# Patient Record
Sex: FEMALE | Race: WHITE | NOT HISPANIC OR LATINO | Employment: FULL TIME | URBAN - METROPOLITAN AREA
[De-identification: names, ages, dates, MRNs, and addresses within clinical notes are randomized per-mention and may not be internally consistent; named-entity substitution may affect disease eponyms.]

---

## 2023-11-29 ENCOUNTER — OFFICE VISIT (OUTPATIENT)
Dept: PRIMARY CARE | Facility: CLINIC | Age: 53
End: 2023-11-29
Payer: COMMERCIAL

## 2023-11-29 ENCOUNTER — LAB (OUTPATIENT)
Dept: LAB | Facility: LAB | Age: 53
End: 2023-11-29
Payer: COMMERCIAL

## 2023-11-29 VITALS
SYSTOLIC BLOOD PRESSURE: 130 MMHG | WEIGHT: 209 LBS | HEART RATE: 76 BPM | OXYGEN SATURATION: 97 % | DIASTOLIC BLOOD PRESSURE: 88 MMHG | BODY MASS INDEX: 33.73 KG/M2

## 2023-11-29 DIAGNOSIS — F31.81 BIPOLAR 2 DISORDER (MULTI): ICD-10-CM

## 2023-11-29 DIAGNOSIS — R41.3 MEMORY DEFICIT: ICD-10-CM

## 2023-11-29 DIAGNOSIS — R41.3 MEMORY DEFICIT: Primary | ICD-10-CM

## 2023-11-29 DIAGNOSIS — I10 HYPERTENSION, UNSPECIFIED TYPE: ICD-10-CM

## 2023-11-29 PROBLEM — F32.A DEPRESSION: Status: ACTIVE | Noted: 2023-11-29

## 2023-11-29 PROBLEM — R06.02 SHORTNESS OF BREATH: Status: ACTIVE | Noted: 2023-11-29

## 2023-11-29 PROBLEM — L81.9 PIGMENTED SKIN LESION SUSPICIOUS FOR MALIGNANT NEOPLASM: Status: ACTIVE | Noted: 2023-11-29

## 2023-11-29 PROBLEM — D22.5 MELANOCYTIC NEVI OF TRUNK: Status: ACTIVE | Noted: 2022-01-27

## 2023-11-29 PROBLEM — H69.90 EUSTACHIAN TUBE DYSFUNCTION: Status: ACTIVE | Noted: 2023-11-29

## 2023-11-29 PROBLEM — L57.0 ACTINIC KERATOSIS: Status: ACTIVE | Noted: 2022-01-27

## 2023-11-29 PROBLEM — U07.1 COVID-19: Status: ACTIVE | Noted: 2023-11-29

## 2023-11-29 PROBLEM — L82.1 OTHER SEBORRHEIC KERATOSIS: Status: ACTIVE | Noted: 2022-01-27

## 2023-11-29 PROBLEM — M75.02 ADHESIVE CAPSULITIS OF LEFT SHOULDER: Status: ACTIVE | Noted: 2023-11-29

## 2023-11-29 PROBLEM — D48.5 NEOPLASM OF UNCERTAIN BEHAVIOR OF SKIN: Status: ACTIVE | Noted: 2022-01-27

## 2023-11-29 PROBLEM — D18.01 HEMANGIOMA OF SKIN AND SUBCUTANEOUS TISSUE: Status: ACTIVE | Noted: 2022-01-27

## 2023-11-29 PROBLEM — N63.10 BREAST MASS, RIGHT: Status: ACTIVE | Noted: 2023-11-29

## 2023-11-29 LAB
25(OH)D3 SERPL-MCNC: 21 NG/ML (ref 30–100)
ALBUMIN SERPL BCP-MCNC: 4.4 G/DL (ref 3.4–5)
ALP SERPL-CCNC: 72 U/L (ref 33–110)
ALT SERPL W P-5'-P-CCNC: 16 U/L (ref 7–45)
AMPHETAMINES UR QL SCN: NORMAL
ANION GAP SERPL CALC-SCNC: 10 MMOL/L (ref 10–20)
APPEARANCE UR: CLEAR
AST SERPL W P-5'-P-CCNC: 14 U/L (ref 9–39)
BARBITURATES UR QL SCN: NORMAL
BASOPHILS # BLD AUTO: 0.03 X10*3/UL (ref 0–0.1)
BASOPHILS NFR BLD AUTO: 0.5 %
BENZODIAZ UR QL SCN: NORMAL
BILIRUB SERPL-MCNC: 0.4 MG/DL (ref 0–1.2)
BILIRUB UR STRIP.AUTO-MCNC: NEGATIVE MG/DL
BUN SERPL-MCNC: 8 MG/DL (ref 6–23)
BZE UR QL SCN: NORMAL
CALCIUM SERPL-MCNC: 8.9 MG/DL (ref 8.6–10.3)
CANNABINOIDS UR QL SCN: NORMAL
CHLORIDE SERPL-SCNC: 103 MMOL/L (ref 98–107)
CO2 SERPL-SCNC: 28 MMOL/L (ref 21–32)
COLOR UR: YELLOW
CREAT SERPL-MCNC: 0.76 MG/DL (ref 0.5–1.05)
EOSINOPHIL # BLD AUTO: 0.09 X10*3/UL (ref 0–0.7)
EOSINOPHIL NFR BLD AUTO: 1.6 %
ERYTHROCYTE [DISTWIDTH] IN BLOOD BY AUTOMATED COUNT: 12.4 % (ref 11.5–14.5)
FENTANYL+NORFENTANYL UR QL SCN: NORMAL
GFR SERPL CREATININE-BSD FRML MDRD: >90 ML/MIN/1.73M*2
GLUCOSE SERPL-MCNC: 88 MG/DL (ref 74–99)
GLUCOSE UR STRIP.AUTO-MCNC: NEGATIVE MG/DL
HCT VFR BLD AUTO: 43.4 % (ref 36–46)
HGB BLD-MCNC: 14 G/DL (ref 12–16)
HIV 1+2 AB+HIV1 P24 AG SERPL QL IA: NONREACTIVE
IMM GRANULOCYTES # BLD AUTO: 0.02 X10*3/UL (ref 0–0.7)
IMM GRANULOCYTES NFR BLD AUTO: 0.4 % (ref 0–0.9)
KETONES UR STRIP.AUTO-MCNC: NEGATIVE MG/DL
LEUKOCYTE ESTERASE UR QL STRIP.AUTO: NEGATIVE
LYMPHOCYTES # BLD AUTO: 2.19 X10*3/UL (ref 1.2–4.8)
LYMPHOCYTES NFR BLD AUTO: 38.4 %
MCH RBC QN AUTO: 30.8 PG (ref 26–34)
MCHC RBC AUTO-ENTMCNC: 32.3 G/DL (ref 32–36)
MCV RBC AUTO: 95 FL (ref 80–100)
MONOCYTES # BLD AUTO: 0.46 X10*3/UL (ref 0.1–1)
MONOCYTES NFR BLD AUTO: 8.1 %
NEUTROPHILS # BLD AUTO: 2.92 X10*3/UL (ref 1.2–7.7)
NEUTROPHILS NFR BLD AUTO: 51 %
NITRITE UR QL STRIP.AUTO: NEGATIVE
NRBC BLD-RTO: 0 /100 WBCS (ref 0–0)
OPIATES UR QL SCN: NORMAL
OXYCODONE+OXYMORPHONE UR QL SCN: NORMAL
PCP UR QL SCN: NORMAL
PH UR STRIP.AUTO: 7 [PH]
PLATELET # BLD AUTO: 256 X10*3/UL (ref 150–450)
POTASSIUM SERPL-SCNC: 3.8 MMOL/L (ref 3.5–5.3)
PROT SERPL-MCNC: 6.7 G/DL (ref 6.4–8.2)
PROT UR STRIP.AUTO-MCNC: NEGATIVE MG/DL
RBC # BLD AUTO: 4.55 X10*6/UL (ref 4–5.2)
RBC # UR STRIP.AUTO: NEGATIVE /UL
SODIUM SERPL-SCNC: 137 MMOL/L (ref 136–145)
SP GR UR STRIP.AUTO: 1
T PALLIDUM AB SER QL: NONREACTIVE
TSH SERPL-ACNC: 2.21 MIU/L (ref 0.44–3.98)
UROBILINOGEN UR STRIP.AUTO-MCNC: <2 MG/DL
VIT B12 SERPL-MCNC: 132 PG/ML (ref 211–911)
WBC # BLD AUTO: 5.7 X10*3/UL (ref 4.4–11.3)

## 2023-11-29 PROCEDURE — 85025 COMPLETE CBC W/AUTO DIFF WBC: CPT

## 2023-11-29 PROCEDURE — 86780 TREPONEMA PALLIDUM: CPT

## 2023-11-29 PROCEDURE — 84443 ASSAY THYROID STIM HORMONE: CPT

## 2023-11-29 PROCEDURE — 80307 DRUG TEST PRSMV CHEM ANLYZR: CPT

## 2023-11-29 PROCEDURE — 1036F TOBACCO NON-USER: CPT | Performed by: FAMILY MEDICINE

## 2023-11-29 PROCEDURE — 36415 COLL VENOUS BLD VENIPUNCTURE: CPT

## 2023-11-29 PROCEDURE — 3075F SYST BP GE 130 - 139MM HG: CPT | Performed by: FAMILY MEDICINE

## 2023-11-29 PROCEDURE — 3079F DIAST BP 80-89 MM HG: CPT | Performed by: FAMILY MEDICINE

## 2023-11-29 PROCEDURE — 82607 VITAMIN B-12: CPT

## 2023-11-29 PROCEDURE — 80053 COMPREHEN METABOLIC PANEL: CPT

## 2023-11-29 PROCEDURE — 82306 VITAMIN D 25 HYDROXY: CPT

## 2023-11-29 PROCEDURE — 81003 URINALYSIS AUTO W/O SCOPE: CPT

## 2023-11-29 PROCEDURE — 99214 OFFICE O/P EST MOD 30 MIN: CPT | Performed by: FAMILY MEDICINE

## 2023-11-29 PROCEDURE — 87389 HIV-1 AG W/HIV-1&-2 AB AG IA: CPT

## 2023-11-29 RX ORDER — ESCITALOPRAM OXALATE 20 MG/1
20 TABLET ORAL DAILY
Qty: 90 TABLET | Refills: 0 | Status: SHIPPED | OUTPATIENT
Start: 2023-11-29 | End: 2024-02-28 | Stop reason: SDUPTHER

## 2023-11-29 RX ORDER — METOPROLOL SUCCINATE 25 MG/1
25 TABLET, EXTENDED RELEASE ORAL DAILY
Qty: 90 TABLET | Refills: 0 | Status: SHIPPED | OUTPATIENT
Start: 2023-11-29 | End: 2024-02-28 | Stop reason: SDUPTHER

## 2023-11-29 RX ORDER — ARIPIPRAZOLE 10 MG/1
10 TABLET ORAL DAILY
Qty: 90 TABLET | Refills: 0 | Status: SHIPPED | OUTPATIENT
Start: 2023-11-29 | End: 2024-02-28 | Stop reason: SDUPTHER

## 2023-11-29 RX ORDER — METOPROLOL SUCCINATE 25 MG/1
25 TABLET, EXTENDED RELEASE ORAL DAILY
COMMUNITY
Start: 2023-07-17 | End: 2023-11-29 | Stop reason: SDUPTHER

## 2023-11-29 RX ORDER — ARIPIPRAZOLE 10 MG/1
1 TABLET ORAL DAILY
COMMUNITY
Start: 2020-09-02 | End: 2023-11-29 | Stop reason: SDUPTHER

## 2023-11-29 RX ORDER — ESCITALOPRAM OXALATE 20 MG/1
1 TABLET ORAL DAILY
COMMUNITY
Start: 2020-09-02 | End: 2023-11-29 | Stop reason: SDUPTHER

## 2023-11-29 ASSESSMENT — MINI MENTAL STATE EXAM
HAND THE PERSON A PENCIL AND PAPER. SAY:  WRITE ANY COMPLETE SENTENCE ON THAT PIECE OF PAPER. (NOTE: THE SENTENCE MUST MAKE SENSE.  IGNORE SPELLING ERRORS): 1 CORRECT
WHAT STATE, COUNTRY, CITY, HOSPITAL, FLOOR: 5 CORRECT
SHOW: PENCIL [OBJECT] ASK: WHAT IS THIS CALLED?: 2 CORRECT
NAME OR REPEAT 3 OBJECTS - (APPLE, TABLE, PENNY) OR (BALL, TREE, FLAG): 3 CORRECT
SUM ALL MMSE QUESTIONS FOR TOTAL SCORE [OUT OF 30].: 28
PLEASE COPY THIS PICTURE (NOTE ALL 10 ANGLES MUST BE PRESENT AND TWO MUST INTERSECT): 1 CORRECT
SAY:  READ THE WORDS ON THE PAGE AND THEN DO WHAT IT SAYS.  THEN HAND THE PERSON THE SHEET WITH CLOSE YOUR EYES ON IT.  IF THE SUBJECT READS AND DOES NOT CLOSE THEIR EYES, REPEAT UP TO THREE TIMES.  SCORE ONLY IF SUBJECT CLOSES EYES.: 3 CORRECT
RECALL THE 3 OBJECTS FROM ABOVE (APPLE, TABLE, PENNY) OR (BALL, TREE, FLAG): 2 CORRECT
WHAT IS THE YEAR, SEASON, DATE, DAY, AND MONTH: 4 CORRECT
SAY: I WOULD LIKE YOU TO REPEAT THIS PHRASE AFTER ME: NO IFS, ANDS, OR BUTS.: 1 CORRECT
SPELL THE WORD WORLD FORWARD AND BACKWARDS OR SERIAL 7S: 5 CORRECT
PLACE DESIGN, ERASER AND PENCIL IN FRONT OF THE PERSON.  SAY:  COPY THIS DESIGN PLEASE.  SHOW: DESIGN. ALLOW: MULTIPLE TRIES. WAIT UNTIL PERSON IS FINISHED AND HANDS IT BACK. SCORE: ONLY FOR DIAGRAM WITH 4-SIDED FIGURE BETWEEN TWO 5-SIDED FIGURES: 1 CORRECT

## 2023-11-29 NOTE — PROGRESS NOTES
Subjective   Patient ID: Debora Latif is a 53 y.o. female who presents for Med Refill (Memory issues).    Med Refill       She is having a lot of trouble with her memory. She is forgetting people she has met and things she has done. People are covering for her. She is getting lost when going places. Forgets peoples names and has forgotten to pay bills.  Doing well with her depression. Needs refills on meds  Review of Systems   All other systems reviewed and are negative.      Objective   /88   Pulse 76   Wt 94.8 kg (209 lb)   SpO2 97%   BMI 33.73 kg/m²     Physical Exam  Constitutional:       Appearance: Normal appearance.   HENT:      Head: Normocephalic.      Right Ear: Tympanic membrane normal.      Nose: Nose normal.      Mouth/Throat:      Mouth: Mucous membranes are moist.   Eyes:      Pupils: Pupils are equal, round, and reactive to light.   Cardiovascular:      Rate and Rhythm: Normal rate and regular rhythm.      Pulses: Normal pulses.   Pulmonary:      Effort: Pulmonary effort is normal.   Abdominal:      General: Abdomen is flat.   Musculoskeletal:         General: Normal range of motion.      Cervical back: Normal range of motion.   Skin:     General: Skin is warm.   Neurological:      Mental Status: She is alert.   Psychiatric:         Mood and Affect: Mood normal.         Assessment/Plan   Diagnoses and all orders for this visit:  Memory deficit  -     ARIPiprazole (Abilify) 10 mg tablet; Take 1 tablet (10 mg) by mouth once daily.  -     escitalopram (Lexapro) 20 mg tablet; Take 1 tablet (20 mg) by mouth once daily.  -     metoprolol succinate XL (Toprol-XL) 25 mg 24 hr tablet; Take 1 tablet (25 mg) by mouth once daily.  -     Syphilis Screen with Reflex; Future  -     HIV 1/2 Antigen/Antibody Screen with Reflex to Confirmation; Future  -     Vitamin B12; Future  -     Vitamin D 25-Hydroxy,Total (for eval of Vitamin D levels); Future  -     Tsh With Reflex To Free T4 If Abnormal; Future  -      CBC and Auto Differential; Future  -     Comprehensive metabolic panel; Future  -     Referral to Adult Neuropsychology; Future  -     Urinalysis with Reflex Microscopic; Future  -     Drug Screen, Urine With Reflex to Confirmation; Future  -     CT head wo IV contrast; Future  Bipolar 2 disorder (CMS/HCC)  -     ARIPiprazole (Abilify) 10 mg tablet; Take 1 tablet (10 mg) by mouth once daily.  -     escitalopram (Lexapro) 20 mg tablet; Take 1 tablet (20 mg) by mouth once daily.  Hypertension, unspecified type  -     metoprolol succinate XL (Toprol-XL) 25 mg 24 hr tablet; Take 1 tablet (25 mg) by mouth once daily.

## 2023-12-04 ENCOUNTER — TELEPHONE (OUTPATIENT)
Dept: PRIMARY CARE | Facility: CLINIC | Age: 53
End: 2023-12-04
Payer: COMMERCIAL

## 2023-12-04 DIAGNOSIS — E55.9 VITAMIN D DEFICIENCY: ICD-10-CM

## 2023-12-04 DIAGNOSIS — E53.8 VITAMIN B12 DEFICIENCY: Primary | ICD-10-CM

## 2023-12-04 RX ORDER — ERGOCALCIFEROL 1.25 MG/1
50000 CAPSULE ORAL
Qty: 4 CAPSULE | Refills: 1 | Status: SHIPPED | OUTPATIENT
Start: 2023-12-04 | End: 2024-01-29

## 2023-12-04 RX ORDER — CYANOCOBALAMIN 1000 UG/ML
1000 INJECTION, SOLUTION INTRAMUSCULAR; SUBCUTANEOUS
Qty: 1 ML | Refills: 2 | Status: SHIPPED | OUTPATIENT
Start: 2023-12-04 | End: 2024-03-27 | Stop reason: ALTCHOICE

## 2023-12-04 NOTE — TELEPHONE ENCOUNTER
----- Message from Liat Lockett MD sent at 12/4/2023  9:45 AM EST -----  done  ----- Message -----  From: Trent Méndez MA  Sent: 11/30/2023  11:26 AM EST  To: Liat Lockett MD    DWP  CAN YOU PLEASE SEND IN SCRIPTS TO PHARM IN CHART   ----- Message -----  From: Liat Lockett MD  Sent: 11/30/2023   7:19 AM EST  [facility]: [facility]    Vit d is low as well as vit b12. Rec replacement for both. B12 replacement is a shot once a month. The vit d would be po once a week. The rest of the lab work came back neg. If she is ok with poc then I will sen in.

## 2023-12-14 ENCOUNTER — APPOINTMENT (OUTPATIENT)
Dept: RADIOLOGY | Facility: CLINIC | Age: 53
End: 2023-12-14
Payer: COMMERCIAL

## 2024-01-03 ENCOUNTER — APPOINTMENT (OUTPATIENT)
Dept: RADIOLOGY | Facility: CLINIC | Age: 54
End: 2024-01-03
Payer: COMMERCIAL

## 2024-02-28 ENCOUNTER — OFFICE VISIT (OUTPATIENT)
Dept: PRIMARY CARE | Facility: CLINIC | Age: 54
End: 2024-02-28
Payer: COMMERCIAL

## 2024-02-28 ENCOUNTER — TELEPHONE (OUTPATIENT)
Dept: PRIMARY CARE | Facility: CLINIC | Age: 54
End: 2024-02-28

## 2024-02-28 VITALS
HEART RATE: 78 BPM | TEMPERATURE: 97.8 F | WEIGHT: 211 LBS | DIASTOLIC BLOOD PRESSURE: 80 MMHG | SYSTOLIC BLOOD PRESSURE: 118 MMHG | HEIGHT: 66 IN | BODY MASS INDEX: 33.91 KG/M2

## 2024-02-28 DIAGNOSIS — F33.41 RECURRENT MAJOR DEPRESSIVE DISORDER, IN PARTIAL REMISSION (CMS-HCC): ICD-10-CM

## 2024-02-28 DIAGNOSIS — Z00.00 WELL ADULT EXAM: ICD-10-CM

## 2024-02-28 DIAGNOSIS — F31.81 BIPOLAR 2 DISORDER (MULTI): Primary | ICD-10-CM

## 2024-02-28 DIAGNOSIS — I10 HYPERTENSION, UNSPECIFIED TYPE: Primary | ICD-10-CM

## 2024-02-28 DIAGNOSIS — R41.3 MEMORY DEFICIT: ICD-10-CM

## 2024-02-28 DIAGNOSIS — I10 HYPERTENSION, UNSPECIFIED TYPE: ICD-10-CM

## 2024-02-28 PROCEDURE — 1036F TOBACCO NON-USER: CPT | Performed by: FAMILY MEDICINE

## 2024-02-28 PROCEDURE — 99213 OFFICE O/P EST LOW 20 MIN: CPT | Performed by: FAMILY MEDICINE

## 2024-02-28 PROCEDURE — 3074F SYST BP LT 130 MM HG: CPT | Performed by: FAMILY MEDICINE

## 2024-02-28 PROCEDURE — 3079F DIAST BP 80-89 MM HG: CPT | Performed by: FAMILY MEDICINE

## 2024-02-28 RX ORDER — METOPROLOL SUCCINATE 25 MG/1
25 TABLET, EXTENDED RELEASE ORAL DAILY
Qty: 90 TABLET | Refills: 0 | Status: SHIPPED | OUTPATIENT
Start: 2024-02-28 | End: 2024-03-27 | Stop reason: SDUPTHER

## 2024-02-28 RX ORDER — ARIPIPRAZOLE 10 MG/1
10 TABLET ORAL DAILY
Qty: 90 TABLET | Refills: 0 | Status: SHIPPED | OUTPATIENT
Start: 2024-02-28 | End: 2024-04-24 | Stop reason: SDUPTHER

## 2024-02-28 RX ORDER — ESCITALOPRAM OXALATE 20 MG/1
20 TABLET ORAL DAILY
Qty: 90 TABLET | Refills: 0 | Status: SHIPPED | OUTPATIENT
Start: 2024-02-28 | End: 2024-04-24 | Stop reason: SDUPTHER

## 2024-02-28 ASSESSMENT — ENCOUNTER SYMPTOMS: SPEECH DIFFICULTY: 1

## 2024-02-28 NOTE — PROGRESS NOTES
"Subjective   Patient ID: Debora Latif is a 53 y.o. female who presents for Follow-up (3 MONTH FOLLOW UP).    HPI   The neuro doc that she was referred to cancelled on her so she is going to get a hold of them and see why. Her meds are still working.  Needs refill on her meds. Bp 130/100 127/100 150/108. Only when she is at the office.   Review of Systems   Neurological:  Positive for speech difficulty.       Objective   /80   Pulse 78   Temp 36.6 °C (97.8 °F)   Ht 1.676 m (5' 6\")   Wt 95.7 kg (211 lb)   BMI 34.06 kg/m²     Physical Exam  Constitutional:       Appearance: Normal appearance.   HENT:      Head: Normocephalic.      Right Ear: Tympanic membrane normal.      Nose: Nose normal.      Mouth/Throat:      Mouth: Mucous membranes are moist.   Eyes:      Pupils: Pupils are equal, round, and reactive to light.   Cardiovascular:      Rate and Rhythm: Normal rate and regular rhythm.      Pulses: Normal pulses.   Pulmonary:      Effort: Pulmonary effort is normal.   Abdominal:      General: Abdomen is flat.   Musculoskeletal:         General: Normal range of motion.      Cervical back: Normal range of motion.   Skin:     General: Skin is warm.   Neurological:      Mental Status: She is alert.   Psychiatric:         Mood and Affect: Mood normal.         Assessment/Plan   Diagnoses and all orders for this visit:  Bipolar 2 disorder (CMS/HCC)  -     ARIPiprazole (Abilify) 10 mg tablet; Take 1 tablet (10 mg) by mouth once daily.  -     escitalopram (Lexapro) 20 mg tablet; Take 1 tablet (20 mg) by mouth once daily.  Memory deficit  -     ARIPiprazole (Abilify) 10 mg tablet; Take 1 tablet (10 mg) by mouth once daily.  -     escitalopram (Lexapro) 20 mg tablet; Take 1 tablet (20 mg) by mouth once daily.  -     metoprolol succinate XL (Toprol-XL) 25 mg 24 hr tablet; Take 1 tablet (25 mg) by mouth once daily.  Hypertension, unspecified type  -     metoprolol succinate XL (Toprol-XL) 25 mg 24 hr tablet; Take 1 " tablet (25 mg) by mouth once daily.  Recurrent major depressive disorder, in partial remission (CMS/HCC)  Cont meds no si no hi

## 2024-03-20 ENCOUNTER — HOSPITAL ENCOUNTER (OUTPATIENT)
Dept: RADIOLOGY | Facility: CLINIC | Age: 54
Discharge: HOME | End: 2024-03-20
Payer: COMMERCIAL

## 2024-03-20 DIAGNOSIS — R41.3 MEMORY DEFICIT: ICD-10-CM

## 2024-03-20 PROCEDURE — 70450 CT HEAD/BRAIN W/O DYE: CPT | Performed by: STUDENT IN AN ORGANIZED HEALTH CARE EDUCATION/TRAINING PROGRAM

## 2024-03-20 PROCEDURE — 70450 CT HEAD/BRAIN W/O DYE: CPT

## 2024-03-27 ENCOUNTER — OFFICE VISIT (OUTPATIENT)
Dept: PRIMARY CARE | Facility: CLINIC | Age: 54
End: 2024-03-27
Payer: COMMERCIAL

## 2024-03-27 ENCOUNTER — LAB (OUTPATIENT)
Dept: LAB | Facility: LAB | Age: 54
End: 2024-03-27
Payer: COMMERCIAL

## 2024-03-27 VITALS
BODY MASS INDEX: 33.97 KG/M2 | DIASTOLIC BLOOD PRESSURE: 70 MMHG | OXYGEN SATURATION: 95 % | WEIGHT: 211.4 LBS | HEART RATE: 62 BPM | TEMPERATURE: 96.9 F | SYSTOLIC BLOOD PRESSURE: 132 MMHG | HEIGHT: 66 IN

## 2024-03-27 DIAGNOSIS — I10 HYPERTENSION, UNSPECIFIED TYPE: ICD-10-CM

## 2024-03-27 DIAGNOSIS — C44.619 BASAL CELL CARCINOMA (BCC) OF LEFT SHOULDER: ICD-10-CM

## 2024-03-27 DIAGNOSIS — R73.9 HYPERGLYCEMIA: ICD-10-CM

## 2024-03-27 DIAGNOSIS — Z00.00 WELL ADULT EXAM: Primary | ICD-10-CM

## 2024-03-27 DIAGNOSIS — L57.0 SOLAR KERATOSIS: ICD-10-CM

## 2024-03-27 DIAGNOSIS — Z00.00 WELL ADULT EXAM: ICD-10-CM

## 2024-03-27 DIAGNOSIS — Z66 DNR (DO NOT RESUSCITATE): ICD-10-CM

## 2024-03-27 DIAGNOSIS — R41.3 MEMORY DEFICIT: ICD-10-CM

## 2024-03-27 LAB
ALBUMIN SERPL BCP-MCNC: 4.5 G/DL (ref 3.4–5)
ALP SERPL-CCNC: 73 U/L (ref 33–110)
ALT SERPL W P-5'-P-CCNC: 20 U/L (ref 7–45)
ANION GAP SERPL CALC-SCNC: 11 MMOL/L (ref 10–20)
APPEARANCE UR: ABNORMAL
AST SERPL W P-5'-P-CCNC: 16 U/L (ref 9–39)
BASOPHILS # BLD AUTO: 0.04 X10*3/UL (ref 0–0.1)
BASOPHILS NFR BLD AUTO: 0.6 %
BILIRUB SERPL-MCNC: 0.6 MG/DL (ref 0–1.2)
BILIRUB UR STRIP.AUTO-MCNC: NEGATIVE MG/DL
BUN SERPL-MCNC: 11 MG/DL (ref 6–23)
CALCIUM SERPL-MCNC: 8.9 MG/DL (ref 8.6–10.3)
CHLORIDE SERPL-SCNC: 104 MMOL/L (ref 98–107)
CHOLEST SERPL-MCNC: 194 MG/DL (ref 0–199)
CHOLESTEROL/HDL RATIO: 3.3
CO2 SERPL-SCNC: 27 MMOL/L (ref 21–32)
COLOR UR: YELLOW
CREAT SERPL-MCNC: 0.8 MG/DL (ref 0.5–1.05)
EGFRCR SERPLBLD CKD-EPI 2021: 87 ML/MIN/1.73M*2
EOSINOPHIL # BLD AUTO: 0.08 X10*3/UL (ref 0–0.7)
EOSINOPHIL NFR BLD AUTO: 1.2 %
ERYTHROCYTE [DISTWIDTH] IN BLOOD BY AUTOMATED COUNT: 12.1 % (ref 11.5–14.5)
GLUCOSE SERPL-MCNC: 95 MG/DL (ref 74–99)
GLUCOSE UR STRIP.AUTO-MCNC: NEGATIVE MG/DL
HCT VFR BLD AUTO: 43.9 % (ref 36–46)
HDLC SERPL-MCNC: 58.8 MG/DL
HGB BLD-MCNC: 15 G/DL (ref 12–16)
IMM GRANULOCYTES # BLD AUTO: 0.02 X10*3/UL (ref 0–0.7)
IMM GRANULOCYTES NFR BLD AUTO: 0.3 % (ref 0–0.9)
KETONES UR STRIP.AUTO-MCNC: ABNORMAL MG/DL
LDLC SERPL CALC-MCNC: 113 MG/DL
LEUKOCYTE ESTERASE UR QL STRIP.AUTO: NEGATIVE
LYMPHOCYTES # BLD AUTO: 2.27 X10*3/UL (ref 1.2–4.8)
LYMPHOCYTES NFR BLD AUTO: 33.3 %
MCH RBC QN AUTO: 30.6 PG (ref 26–34)
MCHC RBC AUTO-ENTMCNC: 34.2 G/DL (ref 32–36)
MCV RBC AUTO: 90 FL (ref 80–100)
MONOCYTES # BLD AUTO: 0.46 X10*3/UL (ref 0.1–1)
MONOCYTES NFR BLD AUTO: 6.8 %
NEUTROPHILS # BLD AUTO: 3.94 X10*3/UL (ref 1.2–7.7)
NEUTROPHILS NFR BLD AUTO: 57.8 %
NITRITE UR QL STRIP.AUTO: NEGATIVE
NON HDL CHOLESTEROL: 135 MG/DL (ref 0–149)
NRBC BLD-RTO: 0 /100 WBCS (ref 0–0)
PH UR STRIP.AUTO: 6 [PH]
PLATELET # BLD AUTO: 254 X10*3/UL (ref 150–450)
POTASSIUM SERPL-SCNC: 3.9 MMOL/L (ref 3.5–5.3)
PROT SERPL-MCNC: 6.8 G/DL (ref 6.4–8.2)
PROT UR STRIP.AUTO-MCNC: NEGATIVE MG/DL
RBC # BLD AUTO: 4.9 X10*6/UL (ref 4–5.2)
RBC # UR STRIP.AUTO: NEGATIVE /UL
SODIUM SERPL-SCNC: 138 MMOL/L (ref 136–145)
SP GR UR STRIP.AUTO: 1.01
TRIGL SERPL-MCNC: 112 MG/DL (ref 0–149)
TSH SERPL-ACNC: 1.67 MIU/L (ref 0.44–3.98)
UROBILINOGEN UR STRIP.AUTO-MCNC: <2 MG/DL
VLDL: 22 MG/DL (ref 0–40)
WBC # BLD AUTO: 6.8 X10*3/UL (ref 4.4–11.3)

## 2024-03-27 PROCEDURE — 85025 COMPLETE CBC W/AUTO DIFF WBC: CPT

## 2024-03-27 PROCEDURE — 1036F TOBACCO NON-USER: CPT | Performed by: FAMILY MEDICINE

## 2024-03-27 PROCEDURE — 36415 COLL VENOUS BLD VENIPUNCTURE: CPT

## 2024-03-27 PROCEDURE — 3075F SYST BP GE 130 - 139MM HG: CPT | Performed by: FAMILY MEDICINE

## 2024-03-27 PROCEDURE — 84443 ASSAY THYROID STIM HORMONE: CPT

## 2024-03-27 PROCEDURE — 80061 LIPID PANEL: CPT

## 2024-03-27 PROCEDURE — 99396 PREV VISIT EST AGE 40-64: CPT | Performed by: FAMILY MEDICINE

## 2024-03-27 PROCEDURE — 80053 COMPREHEN METABOLIC PANEL: CPT

## 2024-03-27 PROCEDURE — 83036 HEMOGLOBIN GLYCOSYLATED A1C: CPT

## 2024-03-27 PROCEDURE — 3078F DIAST BP <80 MM HG: CPT | Performed by: FAMILY MEDICINE

## 2024-03-27 PROCEDURE — 81003 URINALYSIS AUTO W/O SCOPE: CPT

## 2024-03-27 RX ORDER — MEMANTINE HYDROCHLORIDE 5 MG-10 MG
KIT ORAL
Qty: 49 TABLET | Refills: 0 | Status: SHIPPED | OUTPATIENT
Start: 2024-03-27 | End: 2024-04-24 | Stop reason: WASHOUT

## 2024-03-27 RX ORDER — METOPROLOL SUCCINATE 50 MG/1
50 TABLET, EXTENDED RELEASE ORAL DAILY
Qty: 30 TABLET | Refills: 1 | Status: SHIPPED | OUTPATIENT
Start: 2024-03-27 | End: 2024-04-24 | Stop reason: SDUPTHER

## 2024-03-27 ASSESSMENT — PROMIS GLOBAL HEALTH SCALE
RATE_PHYSICAL_HEALTH: GOOD
EMOTIONAL_PROBLEMS: SOMETIMES
RATE_MENTAL_HEALTH: FAIR
RATE_GENERAL_HEALTH: GOOD
CARRYOUT_SOCIAL_ACTIVITIES: EXCELLENT
RATE_QUALITY_OF_LIFE: GOOD
RATE_SOCIAL_SATISFACTION: GOOD
RATE_AVERAGE_FATIGUE: MODERATE
RATE_AVERAGE_PAIN: 1
CARRYOUT_PHYSICAL_ACTIVITIES: MOSTLY

## 2024-03-27 NOTE — PROGRESS NOTES
"Subjective   Patient ID: Debora Latif is a 53 y.o. female who presents for Annual Exam.    HPI   Walking for exercising. Doing chair yoga. Sees dentist and eye doc on regular basis. No cp no sob no bleeding.  Thirsty and drinking a lot. Getting up 3 times a night to pee.  Basal cell found on her back 2 yrs ago but she never got it treated.  Had hyst and has female partner.  Her had scan shows vascular changes. She is having memory issues still. Can't remember recent things. Ok for past  things. 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 States she would rather die from cancer or heart disease than have dementia and wants to postpone memory lose. Or issues.  Review of Systems   All other systems reviewed and are negative.      Objective   /70 (BP Location: Left arm, Patient Position: Sitting)   Pulse 62   Temp 36.1 °C (96.9 °F) (Temporal)   Ht 1.676 m (5' 6\")   Wt 95.9 kg (211 lb 6.4 oz)   SpO2 95%   BMI 34.12 kg/m²     Physical Exam  Constitutional:       Appearance: Normal appearance.   HENT:      Head: Normocephalic.      Right Ear: Tympanic membrane normal.      Nose: Nose normal.      Mouth/Throat:      Mouth: Mucous membranes are moist.   Eyes:      Pupils: Pupils are equal, round, and reactive to light.   Cardiovascular:      Rate and Rhythm: Normal rate and regular rhythm.      Pulses: Normal pulses.   Pulmonary:      Effort: Pulmonary effort is normal.   Abdominal:      General: Abdomen is flat.   Musculoskeletal:         General: Normal range of motion.      Cervical back: Normal range of motion.   Skin:     General: Skin is warm.      Comments: Healed shaved biopsy lesion upper l shoulder. Numerous hyperpig lesions over body in mostly sun exposed areas   Neurological:      Mental Status: She is alert.   Psychiatric:         Mood and Affect: Mood normal.         Assessment/Plan   Diagnoses and all orders for this visit:  Well adult exam   Done will get labs " today  DNR (do not resuscitate)  Memory deficit  -     metoprolol succinate XL (Toprol-XL) 50 mg 24 hr tablet; Take 1 tablet (50 mg) by mouth once daily.  -     memantine (Namenda Titration Pack) 5-10 mg tablet pack; Use as directed on package for first month.  Hypertension, unspecified type  -     metoprolol succinate XL (Toprol-XL) 50 mg 24 hr tablet; Take 1 tablet (50 mg) by mouth once daily.  Hyperglycemia  -     Hemoglobin A1c; Future  Basal cell carcinoma (BCC) of left shoulder  -     Referral to Dermatology  Solar keratosis   Derm referral  Other orders  -     Zoster vaccine, recombinant, adult (SHINGRIX); Future

## 2024-03-28 PROBLEM — R73.03 PREDIABETES: Status: ACTIVE | Noted: 2024-03-28

## 2024-03-28 LAB
EST. AVERAGE GLUCOSE BLD GHB EST-MCNC: 131 MG/DL
HBA1C MFR BLD: 6.2 %

## 2024-04-01 ENCOUNTER — APPOINTMENT (OUTPATIENT)
Dept: PSYCHOLOGY | Facility: HOSPITAL | Age: 54
End: 2024-04-01
Payer: COMMERCIAL

## 2024-04-24 ENCOUNTER — OFFICE VISIT (OUTPATIENT)
Dept: PRIMARY CARE | Facility: CLINIC | Age: 54
End: 2024-04-24
Payer: COMMERCIAL

## 2024-04-24 VITALS
OXYGEN SATURATION: 96 % | SYSTOLIC BLOOD PRESSURE: 132 MMHG | DIASTOLIC BLOOD PRESSURE: 80 MMHG | WEIGHT: 212 LBS | HEIGHT: 66 IN | BODY MASS INDEX: 34.07 KG/M2 | HEART RATE: 73 BPM

## 2024-04-24 DIAGNOSIS — F31.81 BIPOLAR 2 DISORDER (MULTI): ICD-10-CM

## 2024-04-24 DIAGNOSIS — I10 HYPERTENSION, UNSPECIFIED TYPE: ICD-10-CM

## 2024-04-24 DIAGNOSIS — R41.3 MEMORY DEFICIT: Primary | ICD-10-CM

## 2024-04-24 PROCEDURE — 3079F DIAST BP 80-89 MM HG: CPT | Performed by: FAMILY MEDICINE

## 2024-04-24 PROCEDURE — 3075F SYST BP GE 130 - 139MM HG: CPT | Performed by: FAMILY MEDICINE

## 2024-04-24 PROCEDURE — 99212 OFFICE O/P EST SF 10 MIN: CPT | Performed by: FAMILY MEDICINE

## 2024-04-24 RX ORDER — ESCITALOPRAM OXALATE 20 MG/1
20 TABLET ORAL DAILY
Qty: 90 TABLET | Refills: 0 | Status: SHIPPED | OUTPATIENT
Start: 2024-04-24

## 2024-04-24 RX ORDER — METOPROLOL SUCCINATE 50 MG/1
50 TABLET, EXTENDED RELEASE ORAL DAILY
Qty: 90 TABLET | Refills: 0 | Status: SHIPPED | OUTPATIENT
Start: 2024-04-24 | End: 2024-07-23

## 2024-04-24 RX ORDER — ARIPIPRAZOLE 10 MG/1
10 TABLET ORAL DAILY
Qty: 90 TABLET | Refills: 0 | Status: SHIPPED | OUTPATIENT
Start: 2024-04-24

## 2024-04-24 RX ORDER — MEMANTINE HYDROCHLORIDE 10 MG/1
10 TABLET ORAL 2 TIMES DAILY
Qty: 180 TABLET | Refills: 0 | Status: SHIPPED | OUTPATIENT
Start: 2024-04-24 | End: 2024-10-21

## 2024-04-24 NOTE — PROGRESS NOTES
"Subjective   Patient ID: Debora Latif is a 53 y.o. female who presents for Follow-up.    HPI   She is thinking a little more clearly. She is sleeping less but not feeling tired. Not makin as many mistakes at work.  She took a new job and she likes it so far.   Her bps have been running about what she is getting now.  Review of Systems   All other systems reviewed and are negative.      Objective   /80 (BP Location: Right arm, Patient Position: Sitting, BP Cuff Size: Adult)   Pulse 73   Ht 1.676 m (5' 6\")   Wt 96.2 kg (212 lb)   SpO2 96%   BMI 34.22 kg/m²     Physical Exam  Constitutional:       Appearance: Normal appearance.   HENT:      Head: Normocephalic.      Right Ear: Tympanic membrane normal.      Nose: Nose normal.      Mouth/Throat:      Mouth: Mucous membranes are moist.   Eyes:      Pupils: Pupils are equal, round, and reactive to light.   Cardiovascular:      Rate and Rhythm: Normal rate and regular rhythm.      Pulses: Normal pulses.   Pulmonary:      Effort: Pulmonary effort is normal.   Abdominal:      General: Abdomen is flat.   Musculoskeletal:         General: Normal range of motion.      Cervical back: Normal range of motion.   Skin:     General: Skin is warm.   Neurological:      Mental Status: She is alert.   Psychiatric:         Mood and Affect: Mood normal.         Assessment/Plan   Diagnoses and all orders for this visit:  Memory deficit  -     memantine (Namenda) 10 mg tablet; Take 1 tablet (10 mg) by mouth 2 times a day.  -     metoprolol succinate XL (Toprol-XL) 50 mg 24 hr tablet; Take 1 tablet (50 mg) by mouth once daily.  -     escitalopram (Lexapro) 20 mg tablet; Take 1 tablet (20 mg) by mouth once daily.  -     ARIPiprazole (Abilify) 10 mg tablet; Take 1 tablet (10 mg) by mouth once daily.  Hypertension, unspecified type  -     metoprolol succinate XL (Toprol-XL) 50 mg 24 hr tablet; Take 1 tablet (50 mg) by mouth once daily.  Bipolar 2 disorder (Multi)  -     escitalopram " (Lexapro) 20 mg tablet; Take 1 tablet (20 mg) by mouth once daily.  -     ARIPiprazole (Abilify) 10 mg tablet; Take 1 tablet (10 mg) by mouth once daily.

## 2024-07-15 ENCOUNTER — APPOINTMENT (OUTPATIENT)
Dept: DERMATOLOGY | Facility: CLINIC | Age: 54
End: 2024-07-15
Payer: COMMERCIAL

## 2024-07-15 DIAGNOSIS — L82.1 SEBORRHEIC KERATOSIS: ICD-10-CM

## 2024-07-15 DIAGNOSIS — Z12.83 SCREENING EXAM FOR SKIN CANCER: ICD-10-CM

## 2024-07-15 DIAGNOSIS — D48.5 NEOPLASM OF UNCERTAIN BEHAVIOR OF SKIN: Primary | ICD-10-CM

## 2024-07-15 DIAGNOSIS — L81.4 LENTIGO: ICD-10-CM

## 2024-07-15 DIAGNOSIS — C44.619 BASAL CELL CARCINOMA (BCC) OF SKIN OF LEFT UPPER EXTREMITY INCLUDING SHOULDER: ICD-10-CM

## 2024-07-15 DIAGNOSIS — D22.9 MELANOCYTIC NEVUS, UNSPECIFIED LOCATION: ICD-10-CM

## 2024-07-15 DIAGNOSIS — D18.01 HEMANGIOMA OF SKIN: ICD-10-CM

## 2024-07-15 PROCEDURE — 11102 TANGNTL BX SKIN SINGLE LES: CPT | Performed by: NURSE PRACTITIONER

## 2024-07-15 PROCEDURE — 1036F TOBACCO NON-USER: CPT | Performed by: NURSE PRACTITIONER

## 2024-07-15 PROCEDURE — 99213 OFFICE O/P EST LOW 20 MIN: CPT | Performed by: NURSE PRACTITIONER

## 2024-07-15 NOTE — PROGRESS NOTES
Subjective   Debora Latif is a 54 y.o. female who presents for the following: Skin Check (Patient presents to office today for FBSE. ).  Skin Cancer Screening  She has a history of moderate sun exposure. She is in the sun occasionally. She uses sunscreen occasionally. She reports no skin symptoms. Her moles are not changing.          Objective   Well appearing patient in no apparent distress; mood and affect are within normal limits.    A full examination was performed including scalp, head, eyes, ears, nose, lips, neck, chest, axillae, abdomen, back, buttocks, bilateral upper extremities, bilateral lower extremities, hands, feet, fingers, toes, fingernails, and toenails. All findings within normal limits unless otherwise noted below.    Scattered benign lesions. Scar(s) clear no sign of recurrence.     Uniform pigmented macule(s)/papule(s) with reassuring findings on dermoscopy    Stuck on verrucous, tan-brown papules and plaques.      Violaceous/red papule with maroon lagoons     Scattered tan macules in sun-exposed areas.    Right Shoulder - Posterior  6mm pink, scaly macule                Assessment/Plan   Neoplasm of uncertain behavior of skin  Right Shoulder - Posterior    Lesion biopsy  Type of biopsy: tangential    Informed consent: discussed and consent obtained    Timeout: patient name, date of birth, surgical site, and procedure verified    Procedure prep:  Patient was prepped and draped  Anesthesia: the lesion was anesthetized in a standard fashion    Anesthetic:  1% lidocaine w/ epinephrine 1-100,000 local infiltration  Instrument used: DermaBlade    Hemostasis achieved with: aluminum chloride    Outcome: patient tolerated procedure well    Post-procedure details: sterile dressing applied and wound care instructions given    Dressing type: petrolatum and bandage      Specimen 1 - Dermatopathology- DERM LAB  Differential Diagnosis: r/o nmsc  Check Margins Yes/No?:    Comments:    Dermpath Lab: Routine  Histopathology (formalin-fixed tissue)    -  Discussed differential with patient.   - Given uncertainty of clinical diagnosis, a biopsy is recommended in clinic today.   - The patient expressed understanding, is in agreement with this plan, and wishes to proceed with biopsy.   - Oral and written wound care instructions provided.   - Advised the patient that the office will call within 2 weeks to discuss biopsy results.     Basal cell carcinoma (BCC) of skin of left upper extremity including shoulder    Related Procedures  Referral to Dermatology - Mohs Surgery    Screening exam for skin cancer    No evidence of recurrence in scar and benign ROS.   Continue with regular total body skin exams.  ABCDEs of melanoma and atypical moles were discussed with the patient.  Patient was instructed to perform monthly self skin examination.  We recommended that the patient have regular full skin exams given an increased risk of subsequent skin cancers.  The patient was instructed to use sun protective behaviors including use of broad spectrum sunscreens and sun protective clothing to reduce risk of skin cancers.    Melanocytic nevus, unspecified location    -Discussed nature of condition  -Reassurance, benign-appearing features on examination today  -Recommend continued observation    Seborrheic keratosis    Although Seborrheic Keratoses can be troublesome and unsightly, they are entirely benign.  Removal of Seborrheic Keratoses is considered a cosmetic procedure. Removal is typically performed using liquid nitrogen cryotherapy.  Treatment of current lesions does not prevent the development of new Seborrheic Keratoses in the future.    Hemangioma of skin    - A cherry hemangioma is a small macule (small, flat, smooth area) or papule (small, solid bump) formed from an overgrowth of tiny blood vessels in the skin. Cherry hemangiomas are characteristically red or purplish in color. They often first appear in middle adulthood and  usually increase in number with age. Cherry hemangiomas are noncancerous (benign) and are common in adults.  - Lesions are benign, reassured patient.     Lentigo    A solar lentigo (plural, solar lentigines), sometimes called an age spot or liver spot, is a brown macule (small, flat, smooth area of skin) caused by chronic sun or artificial ultraviolet (UV) light exposure. There may be just one lentigo or there may be multiple. This type of lentigo is different from lentigo simplex (discussed separately) because it is caused by exposure to UV light. Solar lentigines are benign, but they do indicate excessive sun exposure, a risk factor for the development of skin cancer.  Lesions are benign, no treatment needed.     Follow up in 6 months for a total body skin exam. Please call me if there are any changes or development of concerning symptoms (lesion/skin condition is changing, bleeding, enlarging, or worsening).

## 2024-07-17 LAB
LABORATORY COMMENT REPORT: NORMAL
PATH REPORT.FINAL DX SPEC: NORMAL
PATH REPORT.GROSS SPEC: NORMAL
PATH REPORT.MICROSCOPIC SPEC OTHER STN: NORMAL
PATH REPORT.RELEVANT HX SPEC: NORMAL
PATH REPORT.TOTAL CANCER: NORMAL

## 2024-07-18 PROBLEM — D11.9 BASAL CELL ADENOMA: Status: ACTIVE | Noted: 2024-07-18

## 2024-07-19 ENCOUNTER — TELEPHONE (OUTPATIENT)
Dept: DERMATOLOGY | Facility: CLINIC | Age: 54
End: 2024-07-19
Payer: COMMERCIAL

## 2024-07-19 NOTE — TELEPHONE ENCOUNTER
Patient contacted via telephone call to discuss BX results, did not answer. VM left detailing nature of call and instructions to return office call at [phone].

## 2024-07-22 DIAGNOSIS — C44.519 BASAL CELL CARCINOMA (BCC) OF SKIN OF OTHER PART OF TORSO: Primary | ICD-10-CM

## 2024-07-24 ENCOUNTER — APPOINTMENT (OUTPATIENT)
Dept: PRIMARY CARE | Facility: CLINIC | Age: 54
End: 2024-07-24
Payer: COMMERCIAL

## 2024-07-24 VITALS
HEART RATE: 66 BPM | RESPIRATION RATE: 18 BRPM | WEIGHT: 205.6 LBS | OXYGEN SATURATION: 96 % | BODY MASS INDEX: 33.04 KG/M2 | HEIGHT: 66 IN | DIASTOLIC BLOOD PRESSURE: 70 MMHG | SYSTOLIC BLOOD PRESSURE: 130 MMHG | TEMPERATURE: 97.2 F

## 2024-07-24 DIAGNOSIS — I10 HYPERTENSION, UNSPECIFIED TYPE: ICD-10-CM

## 2024-07-24 DIAGNOSIS — R21 RASH: ICD-10-CM

## 2024-07-24 DIAGNOSIS — F31.81 BIPOLAR 2 DISORDER (MULTI): ICD-10-CM

## 2024-07-24 DIAGNOSIS — R73.03 PREDIABETES: Primary | ICD-10-CM

## 2024-07-24 DIAGNOSIS — R41.3 MEMORY DEFICIT: ICD-10-CM

## 2024-07-24 LAB — POC HEMOGLOBIN A1C: 5.6 % (ref 4.2–6.5)

## 2024-07-24 PROCEDURE — 3075F SYST BP GE 130 - 139MM HG: CPT | Performed by: FAMILY MEDICINE

## 2024-07-24 PROCEDURE — 3008F BODY MASS INDEX DOCD: CPT | Performed by: FAMILY MEDICINE

## 2024-07-24 PROCEDURE — 1036F TOBACCO NON-USER: CPT | Performed by: FAMILY MEDICINE

## 2024-07-24 PROCEDURE — 83036 HEMOGLOBIN GLYCOSYLATED A1C: CPT | Performed by: FAMILY MEDICINE

## 2024-07-24 PROCEDURE — 99213 OFFICE O/P EST LOW 20 MIN: CPT | Performed by: FAMILY MEDICINE

## 2024-07-24 PROCEDURE — 3078F DIAST BP <80 MM HG: CPT | Performed by: FAMILY MEDICINE

## 2024-07-24 RX ORDER — PREDNISONE 20 MG/1
40 TABLET ORAL DAILY
Qty: 14 TABLET | Refills: 0 | Status: SHIPPED | OUTPATIENT
Start: 2024-07-24 | End: 2024-07-31

## 2024-07-24 RX ORDER — ARIPIPRAZOLE 10 MG/1
10 TABLET ORAL DAILY
Qty: 90 TABLET | Refills: 0 | Status: SHIPPED | OUTPATIENT
Start: 2024-07-24

## 2024-07-24 RX ORDER — PREDNISONE 20 MG/1
40 TABLET ORAL DAILY
Qty: 14 TABLET | Refills: 0 | Status: SHIPPED | OUTPATIENT
Start: 2024-07-24 | End: 2024-07-24 | Stop reason: SDUPTHER

## 2024-07-24 RX ORDER — ESCITALOPRAM OXALATE 20 MG/1
20 TABLET ORAL DAILY
Qty: 90 TABLET | Refills: 0 | Status: SHIPPED | OUTPATIENT
Start: 2024-07-24

## 2024-07-24 RX ORDER — METOPROLOL SUCCINATE 50 MG/1
50 TABLET, EXTENDED RELEASE ORAL DAILY
Qty: 90 TABLET | Refills: 0 | Status: SHIPPED | OUTPATIENT
Start: 2024-07-24 | End: 2024-10-22

## 2024-07-24 RX ORDER — MEMANTINE HYDROCHLORIDE 10 MG/1
10 TABLET ORAL 2 TIMES DAILY
Qty: 180 TABLET | Refills: 0 | Status: SHIPPED | OUTPATIENT
Start: 2024-07-24 | End: 2025-01-20

## 2024-07-24 NOTE — PROGRESS NOTES
"Subjective   Patient ID: Debora Latif is a 54 y.o. female who presents for Follow-up (3 months).    HPI   Changed her diet and she is exercising 30  min a day.   The namenda is causing a little anxiety but she is able to work through it.   Has a rash on her r arm. Started after her skin biopsy. It is spreading to her r breast and her back. Itching and burns. Can't do antihstamine due to work. Creams not helping.  Was sitting with l knee under her Rachel 15 and when she got up the inner part of the l knee started hurting and aches a lot. Feels like gravel inside.   Review of Systems   Skin:  Positive for rash.   All other systems reviewed and are negative.      Objective   /70 (BP Location: Left arm, Patient Position: Sitting, BP Cuff Size: Adult)   Pulse 66   Temp 36.2 °C (97.2 °F) (Temporal)   Resp 18   Ht 1.676 m (5' 6\")   Wt 93.3 kg (205 lb 9.6 oz)   SpO2 96%   BMI 33.18 kg/m²     Physical Exam  Constitutional:       Appearance: Normal appearance.   HENT:      Head: Normocephalic.      Right Ear: Tympanic membrane normal.      Nose: Nose normal.      Mouth/Throat:      Mouth: Mucous membranes are moist.   Eyes:      Pupils: Pupils are equal, round, and reactive to light.   Cardiovascular:      Rate and Rhythm: Normal rate and regular rhythm.      Pulses: Normal pulses.   Pulmonary:      Effort: Pulmonary effort is normal.   Abdominal:      General: Abdomen is flat.   Musculoskeletal:         General: Normal range of motion.      Cervical back: Normal range of motion.      Comments: L knee clicking   Skin:     General: Skin is warm.      Findings: Rash present.      Comments: Rash r arm and chest blanchable maculopapular   Neurological:      Mental Status: She is alert.   Psychiatric:         Mood and Affect: Mood normal.         Assessment/Plan   Diagnoses and all orders for this visit:  Prediabetes  -     POCT glycosylated hemoglobin (Hb A1C) manually resulted  Rash  -     predniSONE (Deltasone) 20 mg " tablet; Take 2 tablets (40 mg) by mouth once daily for 7 days.  Bipolar 2 disorder (Multi)  Memory deficit  Cont with namenda  Hypertension, unspecified type   stable

## 2024-08-07 ENCOUNTER — OFFICE VISIT (OUTPATIENT)
Dept: PSYCHOLOGY | Facility: CLINIC | Age: 54
End: 2024-08-07
Payer: COMMERCIAL

## 2024-08-07 DIAGNOSIS — F31.81 BIPOLAR 2 DISORDER (MULTI): ICD-10-CM

## 2024-08-07 DIAGNOSIS — R41.81 AGE-RELATED COGNITIVE DECLINE: Primary | ICD-10-CM

## 2024-08-07 DIAGNOSIS — R41.9 COGNITIVE COMPLAINTS WITH NORMAL NEUROPSYCHOLOGICAL EXAM: ICD-10-CM

## 2024-08-07 DIAGNOSIS — R41.3 MEMORY CHANGES: ICD-10-CM

## 2024-08-07 PROCEDURE — 96137 PSYCL/NRPSYC TST PHY/QHP EA: CPT | Performed by: CLINICAL NEUROPSYCHOLOGIST

## 2024-08-07 PROCEDURE — 96133 NRPSYC TST EVAL PHYS/QHP EA: CPT | Performed by: CLINICAL NEUROPSYCHOLOGIST

## 2024-08-07 PROCEDURE — 96136 PSYCL/NRPSYC TST PHY/QHP 1ST: CPT | Performed by: CLINICAL NEUROPSYCHOLOGIST

## 2024-08-07 PROCEDURE — 96137 PSYCL/NRPSYC TST PHY/QHP EA: CPT | Mod: AH | Performed by: CLINICAL NEUROPSYCHOLOGIST

## 2024-08-07 PROCEDURE — 96132 NRPSYC TST EVAL PHYS/QHP 1ST: CPT | Performed by: CLINICAL NEUROPSYCHOLOGIST

## 2024-08-07 PROCEDURE — 96133 NRPSYC TST EVAL PHYS/QHP EA: CPT | Mod: AH | Performed by: CLINICAL NEUROPSYCHOLOGIST

## 2024-08-07 PROCEDURE — 96132 NRPSYC TST EVAL PHYS/QHP 1ST: CPT | Mod: AH | Performed by: CLINICAL NEUROPSYCHOLOGIST

## 2024-08-07 PROCEDURE — 96116 NUBHVL XM PHYS/QHP 1ST HR: CPT | Performed by: CLINICAL NEUROPSYCHOLOGIST

## 2024-08-07 PROCEDURE — 96116 NUBHVL XM PHYS/QHP 1ST HR: CPT | Mod: AH | Performed by: CLINICAL NEUROPSYCHOLOGIST

## 2024-08-07 PROCEDURE — 96136 PSYCL/NRPSYC TST PHY/QHP 1ST: CPT | Mod: AH | Performed by: CLINICAL NEUROPSYCHOLOGIST

## 2024-08-07 NOTE — LETTER
2024     Liat Lockett MD  [address] [facility] [place] [address]    Patient: Debora Latif   YOB: 1970   Date of Visit: 2024       Dear Dr. Liat Lockett MD:    Thank you for referring Debora Latif to me for evaluation. Below are my notes for this consultation.  If you have questions, please do not hesitate to call me. I look forward to following your patient along with you.       Sincerely,     Bernice Donnelly, PhD      CC: No Recipients  ______________________________________________________________________________________    Neuropsychology Evaluation    Name: Debora Latif  : 1970  MRN: [id number]  Referring Provider:  Liat Lockett MD/ Family Medicine    Date of Service: 2024    Reason for Referral:  Ms. Latif is a 54 year old, right handed,  woman who presented for neuropsychological testing upon referral by her  PCP as part of ongoing medical/neurological evaluation of cognitive changes, for input on differential diagnosis. This evaluation is intended for clinical purposes only and is NOT intended for legal/forensic or disability purposes. Verbal consent for neuropsychological services was obtained.     Summary/Impression:   Results of neuropsychological testing revealed a normal limits cognitive profile with well preserved abilities across the cognitive domains of memory, language, attention/executive funcitoning, and visuospatial abilities.  Memory an area of presenting concern was well preserved in both the verbal/auditory and visual modalities.  Assessment of mood was indicative of mild depression with anxiety in context of longstanding bipolar disorder, stable with ongoing pharmacological treatment.  These findings are not suggestive of an incipient neurodegenerative process/dementia/Mild Cognitive Impairment (MCI) at this time. There are vascular risk factors in her history of HTN, cigarette smoking and neuroimaging CT suggesting  chronic small vessel ischemic changes.  However, at this time her cognitive profile does not indicate a vascular dementia as her test performance was within normal limits.  She is on memantine with perceived benefit.   Bipolar disorder and chronic small vessel ischemia can affect attentional/executive functioning, particularly with stressors and may contribute to perceived attentional/memory issues in day to day living.      Continued close management of vascular risk factors and bipolar disorder.  This evaluation can serve as a comparative baseline should there be future concerns regarding cognitive decline.      Plan: This neuropsychological assessment is part of a multidisciplinary evaluation. The results will be communicated to the referring provider via shared electronic medical record and reviewed with the patient by referring MD in context of complete medical history and evaluation.    Billing Note: In addition to time spent directly with the patient conducting neurobehavioral exam and face to face neuropsychological testing by a neuropsychologist, the total time billed for also included time spent for record review, test selection, scoring of tests, interpretation of tests, integration of findings, and report/note preparation. Specific billing codes are as follows:   1 hour unit 32452  neurobehavioral status exam  1 hour unit 41099  neuropsychologist evaluation services (interpretation, integration, report preparation)  1 hour unit 12943  neuropsychologist evaluation services (interpretation, integration, report preparation)   1 30 minute unit session 13796  face to face testing by neuropsychologist  3 30 min units 18444  face to face testing and scoring by neuropsychologist    Results of the assessment were conveyed to the patient, caregiver, and/or provider within 14 days of completion.     History of Presenting Illness/Relevant Background:   The following summary of history and presenting issue(s) was  obtained through review of EMR notes detailing medical and/or neurological evaluation to date, medical history, diagnostic studies and clinical interview with Ms. Latif.     Cognitive changes: Gradual onset of memory changes over the past several years. Examples of repetitive comments and questions, forgetting how to get to places she has been to while driving, need to write more notes. Also has gaps in memory for past events like vacation trips (but will come back to her if she sees a picture and discusses it.  Does fine when she sticks with a routine, problems usually noticeable in new situations. Noted worsening after COVID infection and with a medication Detrol for nocturia (since discontinued). She reported she thinks she has vascular dementia after a MRI revealed chronic small vessel ischemic changes. Her PCP started her on memantine and she perceives an improvement.     Mood/Behavioral changes:  history of bipolar disorder dx in mid 30s, but she notes likely onset in adolescence. Stable with ongoing pharmacological treatment.  Several years ago depression was exacerbated with a divorce when spouse left her and with the death of her mother and death of a son.      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 No work performance issues     Comorbid medical issues:   Medical conditions potentially affecting cognition include HTN, bipolar disorder. Former cigarette smoker, now chews nicotine gum. Marijuana use approximately 2x/week. Social alcohol use with  "past heavy drinking remote young adulthood. Remote cocaine use young adulthood.     Relevant Family History:  mother had later life memory changes in context of multiple medical issues including COPD, heart disease with no formal dx dementia.     Psychosocial history:  History relevant to cognitive functioning includes normal birth and early development.  Was told she was \"advanced\" in developmental milestones and academics. No early learning problems, attentional difficulties, or behavioral problems in school. Grades were As.  She completed 9 years of formal education, left high school half way through 10th grade at age 14 when pregnant and got . She has been  and then  several times 3 children, 1 . Obtained her GED at that time. Later obtained LPN.  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       Neurological/medical evaluation to date:   Cognitive screening - no formal cognitive testing found in EMR     Neuroimaging -  CT head wo IV contrast  Result Date: 3/20/2024  FINDINGS:  Nonspecific low attenuation in the white matter, likely secondary to chronic small vessel ischemic disease. The grey-white differentiation is intact. There is no mass effect or midline shift. No acute intracranial hemorrhage.   Calvarium: The calvarium is unremarkable. Partially visualized chronic deformity of the inferior left orbital wall.   Paranasal sinuses and mastoids: The visualized paranasal sinuses and mastoids are clear.   No acute intracranial pathology.       Tests/Data Sources: This neuropsychological evaluation consisted of a review of available medical records, interview,  neurobehavioral examination, and the following standardized neuropsychological tests: MOCA Test; Dot Counting Test; Reading subtest (WRAT3); Digit Span, Coding, Block Design, Vocabulary, Similarities subtests of Wechsler Adult Intelligence Scale-4th Edition (WAIS-IV); Word " List Learning and Memory Test from the Alzheimers Disease Assessment Scale-Cognitive (ADAS-Cog), CERAD version; Marrero Figures Constructional Praxis Test (copy and recall), [id number]-[id number]-[id number] version; Logical Memory subtests of the Wechsler Memory Scale-4th Edition (WMS-IV); Luis Complex Figure Test (copy and recall); Spicer Naming Test; Category Fluency Test (animals); Controlled Oral Word Association Test (F,A,S); Trailmaking Test (Parts A and B); Stroop Color and Word Test; Key Search Test from the Behavioural Assessment of the Dysexecutive Syndrome (BADS); Line Orientation subtest of the Repeatable Battery for the Assessment of Neuropsychological Status (RBANS); Grooved Pegboard Test; PHQ9 Patient Health Questionnaire; GAD7 Generalized Anxiety Disorders questionnaire    Cognitive testing was administered and results were used to inform counseling on safety and potential patient risks.  Cognitive testing was administered and interpretation of results included consideration of appropriate and relevant cultural-linguistic and demographic factors.  Cognitive testing was administered and results of assessment informed the determination of diagnosis or further clarified etiological factors of cognitive impairment or complaints.    Behavioral Observations/Neurobehavioral Status Exam:   Well groomed in coordinated attire.  Alert and oriented. Ambulated independently with unremarkable gait on casual inspection. Conversational speech was fluent, normal rate, prosody, and volume. Comprehension of instructions was good. Affect was full, mood was  euthymic.  Cooperative, sociable, fully engaged with testing, good effort, steady work habits.  Adequate auditory and visual perception of test stimuli.  Valid representation of current level of cognitive functioning.      Testing Interpretation Guidelines: Tests listed in Tests section were interpreted using normative data for age, education, ethnicity, and gender yielding scaled scores  (Mean=10+-3), z scores (Mean=0), or T scores (Mean=50+-10).  A z score of  -1.5, a scaled score of less than 7, and/or a T score of less than 35 is suggestive of impairment.   Findings are based on the interpretation of test scores, clinical interview, and behavioral observations.  The following section provides a summary of normal/abnormal findings by cognitive domain.  A detailed list of test scores is not part of this note.     Test Results by Cognitive Domain     Orientation and mental status:  Normal    Obtained a MOCA=26/30 at the normal limits cutoff >=26.  Errors in cube copy, serial subtractions, abstraction with 5/5 delayed recall.  Fully oriented to person, time, and place. Able to name the current and past presidents, and discuss current events accurately and in depth on an international, national, and local level.     Premorbid Functioning:  Normal  estimated premorbid intellectual functioning within the average/above average range based on educational and occupational history, fund of general knowledge, vocabulary test correlated with overall IQ, and college reading level    Language:  Abnormal  average  Object/confrontation naming was within the average range for age and education with a score of 57/60 (T=51, Mean T=50+/-10).  Semantic/category fluency (i.e., rapidly generating exemplars to the category animals in one minute) was within the average range (18 animals, T=48).  Phonemic/letter fluency (i.e., the ability to rapidly generate words beginning with the letters F,A,S in 3 minutes)  was within the average/high average range (52 FAS words, T=58).  Receptive language was intact as evidenced by the ability to follow test instructions and complete simple two- and three-stage commands.     Verbal/auditory Memory:  Normal   average  Average/low average learning curve in the ability to learn a list of 10 words over 3 learning trials (3,5,7/10) with average delayed recall of the list (7/10 words,  "-.6SD).  Recognition memory for the words was well preserved (20/20). Learning and recall of verbal material presented in a story format, was average/high average for immediate story recall  (scaled score=12, Mean scaled score=10 +-3) and delayed story recall was within the average/high average range (scaled score=12).        Visual Memory:  Normal     average  Average/high average  immediate recall of 4 geometric shapes drawn several minutes earlier (4/4).  Immediate recall of a complex visual design was average/low average  (T=42). Delayed recall of this complex figure after a 30 minute interval was average/high average (T=58).      Attention/Executive Functioning:  Normal   average  On a test of basic attention and working memory requiring the repetition of strings of numbers forwards and backwards, performance was average (Digit Span scaled score=8).  Aspects of executive functioning involving complex attention, mental flexibility, and speeded information processing, were average. Specifically, on a relatively simple task of information processing involving the sequential connection of randomly placed numbers (Wiseman Making Test, Part A), performance was average (T=48).  On a more difficult task requiring the connection of randomly placed numbers and letters in a 1-A-2-B-3-C…etc. sequence (Trail Making Test, Part B), performance was within the average range (T=53).  Ability to maintain the alternating “number/letter” set demands of the task was well preserved.   On a coding task, involving the speeded matching of numbers/symbols, performance was average (Coding scaled score=11).  On a test of executive functioning involving divided and directed attention, and filtering out of distractions, without a motor component, performance was average for the challenging task of reading incongruent color-words (e.g. the word \"red\" printed in blue ink)  (T=50).  Aspects of frontal/executive functioning involving verbal " reasoning and abstraction were  average/high average for ability to explain how 2 things were alike or related (Similarities scaled score=12)   On a nonverbal test of reasoning/judgment involving the generation of a search strategy for a lost key in a large field, score was  within the average range.      Visuospatial/Visuoconstruction:  Normal     average  Copies of geometric figures of increased difficulty were precise and accurate for a [place], shakira, overlapping rectangles, with minor errors in internal lines of cube copy, with an overall score within the average range (9/11 elements).  Copy of a complex visual design was accurate (33/36 elements).  Ability to reproduce visual designs using blocks was average (Block Design scaled score=10). Spatial orientation/judgment was average on a judgment of line orientation test (18/20).      Motor: Normal  average  Testing of fine motor speed/coordination (timed test of placing pegs into a pegboard) revealed average bilateral speed.     Assessment of Mood: Mild depression/anxiety  Depression screen was at the borderline/mild depression range on a self-report measure (PHQ9=7/27) and anxiety screen was at the borderline/mild anxiety range on a self-report measure (GAD7=6/21).  Endorsed mild worry, on edge, occasionally feeling down, bad about self. Ongoing pharmacological treatment of bipolar disorder. No suicidal ideation. Sleep and appetite are stable.  Perceives adequate social support in network of family and friends.

## 2024-08-08 ENCOUNTER — APPOINTMENT (OUTPATIENT)
Dept: DERMATOLOGY | Facility: CLINIC | Age: 54
End: 2024-08-08
Payer: COMMERCIAL

## 2024-08-08 VITALS — SYSTOLIC BLOOD PRESSURE: 135 MMHG | DIASTOLIC BLOOD PRESSURE: 88 MMHG | HEART RATE: 66 BPM

## 2024-08-08 DIAGNOSIS — C44.619 BASAL CELL CARCINOMA (BCC) OF SKIN OF LEFT UPPER EXTREMITY INCLUDING SHOULDER: ICD-10-CM

## 2024-08-08 NOTE — PROGRESS NOTES
Office Visit Note  Date: 8/8/2024  Surgeon:  Bk Simms MD  Office Location:  [address] [id number] [address] [address]  Dept: [phone]  Dept Fax: [phone]  Referring Provider: Susan L Mayne, LUCRECIA-MAG  [address] [address]    Martinez Latif is a 54 y.o. female who presents for the following: treatment of a basal cell carcinoma on the left posterior shoulder.     According to the patient, the lesion has been present for approximately greater than 1 year at the time of diagnosis.  The lesion is not causing symptoms.  The lesion has not been treated previously.    The patient does not have a pacemaker / defibrillator.  The patient does not have a heart valve / joint replacement.    The patient is not on blood thinners.  The patient does not have a history of hepatitis B or C.  The patient does not have a history of HIV.  The patient does not have a history of immunosuppression (e.g. organ transplantation, malignancy, medications)    Of note, the patient has been dealing with a rash that started 3 weeks ago and has been treated with prednisone. The rash rebounded when her prednisone course ended so she needed a second course. She continues to take prednisone 40 mg daily. Today, the rash is nearly imperceptible as it has improved on prednisone.     Review of Systems:  No other skin or systemic complaints other than what is documented elsewhere in the note.    MEDICAL HISTORY: clinically relevant history including significant past medical history, medications and allergies was reviewed and documented in Epic.    Objective   Well appearing patient in no apparent distress; mood and affect are within normal limits.  Vital signs: See record.  Noted on the Left Posterior Shoulder  Is a 1.2 x 1.0 cm scar        The patient confirmed the identified site.    Discussion:  The nature of the diagnosis was explained. The lesion is a skin  cancer.  It has a risk of local growth and distant spread. The condition is associated with sun exposure.       Risks, benefits, side effects of Mohs surgery were discussed with patient and the patient voiced understanding.  The patient elected for Mohs surgery but surgery was deferred today as the patient remains on high dose prednisone and so that the patient can recover from her rash.     The patient will be rescheduled for Mohs surgery.

## 2024-08-09 ENCOUNTER — DOCUMENTATION (OUTPATIENT)
Dept: PSYCHOLOGY | Facility: CLINIC | Age: 54
End: 2024-08-09
Payer: COMMERCIAL

## 2024-08-09 NOTE — PROGRESS NOTES
Neuropsychology Evaluation    Name: Debora Latif  : 1970  MRN: [id number]  Referring Provider:  Liat Lockett MD/ Family Medicine    Date of Service: 2024    Reason for Referral:  Ms. Latif is a 54 year old, right handed,  woman who presented for neuropsychological testing upon referral by her  PCP as part of ongoing medical/neurological evaluation of cognitive changes, for input on differential diagnosis. This evaluation is intended for clinical purposes only and is NOT intended for legal/forensic or disability purposes. Verbal consent for neuropsychological services was obtained.     Summary/Impression:   Results of neuropsychological testing revealed a normal limits cognitive profile with well preserved abilities across the cognitive domains of memory, language, attention/executive funcitoning, and visuospatial abilities.  Memory an area of presenting concern was well preserved in both the verbal/auditory and visual modalities.  Assessment of mood was indicative of mild depression with anxiety in context of longstanding bipolar disorder, stable with ongoing pharmacological treatment.  These findings are not suggestive of an incipient neurodegenerative process/dementia/Mild Cognitive Impairment (MCI) at this time. There are vascular risk factors in her history of HTN, cigarette smoking and neuroimaging CT suggesting chronic small vessel ischemic changes.  However, at this time her cognitive profile does not indicate a vascular dementia as her test performance was within normal limits.  She is on memantine with perceived benefit.   Bipolar disorder and chronic small vessel ischemia can affect attentional/executive functioning, particularly with stressors and may contribute to perceived attentional/memory issues in day to day living.      Continued close management of vascular risk factors and bipolar disorder.  This evaluation can serve as a comparative baseline should there be future  concerns regarding cognitive decline.      Plan: This neuropsychological assessment is part of a multidisciplinary evaluation. The results will be communicated to the referring provider via shared electronic medical record and reviewed with the patient by referring MD in context of complete medical history and evaluation.    Billing Note: In addition to time spent directly with the patient conducting neurobehavioral exam and face to face neuropsychological testing by a neuropsychologist, the total time billed for also included time spent for record review, test selection, scoring of tests, interpretation of tests, integration of findings, and report/note preparation. Specific billing codes are as follows:   1 hour unit 19948  neurobehavioral status exam  1 hour unit 68494  neuropsychologist evaluation services (interpretation, integration, report preparation)  1 hour unit 65088  neuropsychologist evaluation services (interpretation, integration, report preparation)   1 30 minute unit session [id number]  face to face testing by neuropsychologist  3 30 min units [id number]  face to face testing and scoring by neuropsychologist    Results of the assessment were conveyed to the patient, caregiver, and/or provider within 14 days of completion.     History of Presenting Illness/Relevant Background:   The following summary of history and presenting issue(s) was obtained through review of EMR notes detailing medical and/or neurological evaluation to date, medical history, diagnostic studies and clinical interview with Ms. Latif.     Cognitive changes: Gradual onset of memory changes over the past several years. Examples of repetitive comments and questions, forgetting how to get to places she has been to while driving, need to write more notes. Also has gaps in memory for past events like vacation trips (but will come back to her if she sees a picture and discusses it.  Does fine when she sticks with a routine, problems usually  "noticeable in new situations. Noted worsening after COVID infection and with a medication Detrol for nocturia (since discontinued). She reported she thinks she has vascular dementia after a MRI revealed chronic small vessel ischemic changes. Her PCP started her on memantine and she perceives an improvement.     Mood/Behavioral changes:  history of bipolar disorder dx in mid 30s, but she notes likely onset in adolescence. Stable with ongoing pharmacological treatment.  Several years ago depression was exacerbated with a divorce when spouse left her and with the death of her mother and death of a son.      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 No work performance issues     Comorbid medical issues:   Medical conditions potentially affecting cognition include HTN, bipolar disorder. Former cigarette smoker, now chews nicotine gum. Marijuana use approximately 2x/week. Social alcohol use with past heavy drinking remote young adulthood. Remote cocaine use young adulthood.     Relevant Family History:  mother had later life memory changes in context of multiple medical issues including COPD, heart disease with no formal dx dementia.     Psychosocial history:  History relevant to cognitive functioning includes normal birth and early development.  Was told she was \"advanced\" in developmental milestones and academics. No early learning problems, attentional difficulties, or behavioral problems in school. Grades were As.  She completed 9 years of formal " education, left high school half way through 10th grade at age 14 when pregnant and got . She has been  and then  several times 3 children, 1 . Obtained her GED at that time. Later obtained LPN.  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       Neurological/medical evaluation to date:   Cognitive screening - no formal cognitive testing found in EMR     Neuroimaging -  CT head wo IV contrast  Result Date: 3/20/2024  FINDINGS:  Nonspecific low attenuation in the white matter, likely secondary to chronic small vessel ischemic disease. The grey-white differentiation is intact. There is no mass effect or midline shift. No acute intracranial hemorrhage.   Calvarium: The calvarium is unremarkable. Partially visualized chronic deformity of the inferior left orbital wall.   Paranasal sinuses and mastoids: The visualized paranasal sinuses and mastoids are clear.   No acute intracranial pathology.       Tests/Data Sources: This neuropsychological evaluation consisted of a review of available medical records, interview,  neurobehavioral examination, and the following standardized neuropsychological tests: MOCA Test; Dot Counting Test; Reading subtest (WRAT3); Digit Span, Coding, Block Design, Vocabulary, Similarities subtests of Wechsler Adult Intelligence Scale-4th Edition (WAIS-IV); Word List Learning and Memory Test from the Alzheimers Disease Assessment Scale-Cognitive (ADAS-Cog), CERAD version; Marrero Figures Constructional Praxis Test (copy and recall), AAVA-Iwg-XRSLN version; Logical Memory subtests of the Wechsler Memory Scale-4th Edition (WMS-IV); Luis Complex Figure Test (copy and recall); [place] Naming Test; Category Fluency Test (animals); Controlled Oral Word Association Test (F,A,S); Trailmaking Test (Parts A and B); Stroop Color and Word Test; Key Search Test from the Behavioural Assessment of the Dysexecutive Syndrome (BADS); Line  Orientation subtest of the Repeatable Battery for the Assessment of Neuropsychological Status (RBANS); Grooved Pegboard Test; PHQ9 Patient Health Questionnaire; GAD7 Generalized Anxiety Disorders questionnaire    Cognitive testing was administered and results were used to inform counseling on safety and potential patient risks.  Cognitive testing was administered and interpretation of results included consideration of appropriate and relevant cultural-linguistic and demographic factors.  Cognitive testing was administered and results of assessment informed the determination of diagnosis or further clarified etiological factors of cognitive impairment or complaints.    Behavioral Observations/Neurobehavioral Status Exam:   Well groomed in coordinated attire.  Alert and oriented. Ambulated independently with unremarkable gait on casual inspection. Conversational speech was fluent, normal rate, prosody, and volume. Comprehension of instructions was good. Affect was full, mood was  euthymic.  Cooperative, sociable, fully engaged with testing, good effort, steady work habits.  Adequate auditory and visual perception of test stimuli.  Valid representation of current level of cognitive functioning.      Testing Interpretation Guidelines: Tests listed in Tests section were interpreted using normative data for age, education, ethnicity, and gender yielding scaled scores (Mean=10+-3), z scores (Mean=0), or T scores (Mean=50+-10).  A z score of  -1.5, a scaled score of less than 7, and/or a T score of less than 35 is suggestive of impairment.   Findings are based on the interpretation of test scores, clinical interview, and behavioral observations.  The following section provides a summary of normal/abnormal findings by cognitive domain.  A detailed list of test scores is not part of this note.     Test Results by Cognitive Domain     Orientation and mental status:  Normal    Obtained a MOCA=26/30 at the normal limits cutoff  >=26.  Errors in cube copy, serial subtractions, abstraction with 5/5 delayed recall.  Fully oriented to person, time, and place. Able to name the current and past presidents, and discuss current events accurately and in depth on an international, national, and local level.     Premorbid Functioning:  Normal  estimated premorbid intellectual functioning within the average/above average range based on educational and occupational history, fund of general knowledge, vocabulary test correlated with overall IQ, and college reading level    Language:  Abnormal  average  Object/confrontation naming was within the average range for age and education with a score of 57/60 (T=51, Mean T=50+/-10).  Semantic/category fluency (i.e., rapidly generating exemplars to the category animals in one minute) was within the average range (18 animals, T=48).  Phonemic/letter fluency (i.e., the ability to rapidly generate words beginning with the letters F,A,S in 3 minutes)  was within the average/high average range (52 FAS words, T=58).  Receptive language was intact as evidenced by the ability to follow test instructions and complete simple two- and three-stage commands.     Verbal/auditory Memory:  Normal   average  Average/low average learning curve in the ability to learn a list of 10 words over 3 learning trials (3,5,7/10) with average delayed recall of the list (7/10 words, -.6SD).  Recognition memory for the words was well preserved (20/20). Learning and recall of verbal material presented in a story format, was average/high average for immediate story recall  (scaled score=12, Mean scaled score=10 +-3) and delayed story recall was within the average/high average range (scaled score=12).        Visual Memory:  Normal     average  Average/high average  immediate recall of 4 geometric shapes drawn several minutes earlier (4/4).  Immediate recall of a complex visual design was average/low average  (T=42). Delayed recall of this  "complex figure after a 30 minute interval was average/high average (T=58).      Attention/Executive Functioning:  Normal   average  On a test of basic attention and working memory requiring the repetition of strings of numbers forwards and backwards, performance was average (Digit Span scaled score=8).  Aspects of executive functioning involving complex attention, mental flexibility, and speeded information processing, were average. Specifically, on a relatively simple task of information processing involving the sequential connection of randomly placed numbers ([place] Making Test, Part A), performance was average (T=48).  On a more difficult task requiring the connection of randomly placed numbers and letters in a 1-A-2-B-3-C…etc. sequence (Trail Making Test, Part B), performance was within the average range (T=53).  Ability to maintain the alternating “number/letter” set demands of the task was well preserved.   On a coding task, involving the speeded matching of numbers/symbols, performance was average (Coding scaled score=11).  On a test of executive functioning involving divided and directed attention, and filtering out of distractions, without a motor component, performance was average for the challenging task of reading incongruent color-words (e.g. the word \"red\" printed in blue ink)  (T=50).  Aspects of frontal/executive functioning involving verbal reasoning and abstraction were  average/high average for ability to explain how 2 things were alike or related (Similarities scaled score=12)   On a nonverbal test of reasoning/judgment involving the generation of a search strategy for a lost key in a large field, score was  within the average range.      Visuospatial/Visuoconstruction:  Normal     average  Copies of geometric figures of increased difficulty were precise and accurate for a [place] IRA, shakira, overlapping rectangles, with minor errors in internal lines of cube copy, with an overall score within the " average range (9/11 elements).  Copy of a complex visual design was accurate (33/36 elements).  Ability to reproduce visual designs using blocks was average (Block Design scaled score=10). Spatial orientation/judgment was average on a judgment of line orientation test (18/20).      Motor: Normal  average  Testing of fine motor speed/coordination (timed test of placing pegs into a pegboard) revealed average bilateral speed.     Assessment of Mood: Mild depression/anxiety  Depression screen was at the borderline/mild depression range on a self-report measure (PHQ9=7/27) and anxiety screen was at the borderline/mild anxiety range on a self-report measure (GAD7=6/21).  Endorsed mild worry, on edge, occasionally feeling down, bad about self. Ongoing pharmacological treatment of bipolar disorder. No suicidal ideation. Sleep and appetite are stable.  Perceives adequate social support in network of family and friends.

## 2024-08-26 ENCOUNTER — APPOINTMENT (OUTPATIENT)
Dept: DERMATOLOGY | Facility: CLINIC | Age: 54
End: 2024-08-26
Payer: COMMERCIAL

## 2024-08-26 DIAGNOSIS — R21 RASH AND OTHER NONSPECIFIC SKIN ERUPTION: Primary | ICD-10-CM

## 2024-08-26 PROCEDURE — 1036F TOBACCO NON-USER: CPT | Performed by: NURSE PRACTITIONER

## 2024-08-26 PROCEDURE — 99213 OFFICE O/P EST LOW 20 MIN: CPT | Performed by: NURSE PRACTITIONER

## 2024-08-26 RX ORDER — TRIAMCINOLONE ACETONIDE 1 MG/G
CREAM TOPICAL
Qty: 453 G | Refills: 1 | Status: SHIPPED | OUTPATIENT
Start: 2024-08-26

## 2024-08-26 ASSESSMENT — DERMATOLOGY QUALITY OF LIFE (QOL) ASSESSMENT
RATE HOW BOTHERED YOU ARE BY SYMPTOMS OF YOUR SKIN PROBLEM (EG, ITCHING, STINGING BURNING, HURTING OR SKIN IRRITATION): 6 - ALWAYS BOTHERED
RATE HOW BOTHERED YOU ARE BY EFFECTS OF YOUR SKIN PROBLEMS ON YOUR ACTIVITIES (EG, GOING OUT, ACCOMPLISHING WHAT YOU WANT, WORK ACTIVITIES OR YOUR RELATIONSHIPS WITH OTHERS): 3
WHAT SINGLE SKIN CONDITION LISTED BELOW IS THE PATIENT ANSWERING THE QUALITY-OF-LIFE ASSESSMENT QUESTIONS ABOUT: DERMATITIS
WHAT SINGLE SKIN CONDITION LISTED BELOW IS THE PATIENT ANSWERING THE QUALITY-OF-LIFE ASSESSMENT QUESTIONS ABOUT: DERMATITIS
RATE HOW EMOTIONALLY BOTHERED YOU ARE BY YOUR SKIN PROBLEM (FOR EXAMPLE, WORRY, EMBARRASSMENT, FRUSTRATION): 3
DATE THE QUALITY-OF-LIFE ASSESSMENT WAS COMPLETED: 07/15/2024
RATE HOW BOTHERED YOU ARE BY SYMPTOMS OF YOUR SKIN PROBLEM (EG, ITCHING, STINGING BURNING, HURTING OR SKIN IRRITATION): 6 - ALWAYS BOTHERED
RATE HOW BOTHERED YOU ARE BY EFFECTS OF YOUR SKIN PROBLEMS ON YOUR ACTIVITIES (EG, GOING OUT, ACCOMPLISHING WHAT YOU WANT, WORK ACTIVITIES OR YOUR RELATIONSHIPS WITH OTHERS): 3
DATE THE QUALITY-OF-LIFE ASSESSMENT WAS COMPLETED: 67036
RATE HOW EMOTIONALLY BOTHERED YOU ARE BY YOUR SKIN PROBLEM (FOR EXAMPLE, WORRY, EMBARRASSMENT, FRUSTRATION): 3

## 2024-08-26 NOTE — PROGRESS NOTES
Subjective   Debora Latif is a 54 y.o. female who presents for the following: Rash.  Rash  Patient presents for evaluation of a rash involving the upper body. Rash started a few weeks ago. Lesions are red, and raised in texture. Rash has not changed over time. Rash is pruritic. Associated symptoms: none. Patient denies: abdominal pain, arthralgia, congestion, cough, crankiness, decrease in appetite, decrease in energy level, fever, headache, irritability, myalgia, nausea, sore throat, and vomiting. Patient has not had contacts with similar rash. Patient has not had new exposures (soaps, lotions, laundry detergents, foods, medications, plants, insects or animals).              Objective   Well appearing patient in no apparent distress; mood and affect are within normal limits.    A full examination was performed including scalp, head, eyes, ears, nose, lips, neck, chest, axillae, abdomen, back, buttocks, bilateral upper extremities, bilateral lower extremities, hands, feet, fingers, toes, fingernails, and toenails. All findings within normal limits unless otherwise noted below.    Arms, trunk, medial upper legs  Scattered on upper extremities, thighs, and trunk are 1mm-2mm erythematous papules in various phases of resolution. No trigger identified. No new lesions, no recent illness, no medication changes within the past few weeks. No recent travel.       Assessment/Plan   Rash and other nonspecific skin eruption  Arms, trunk, medial upper legs    - Discussed the differential with Ms. Latif - Allergic vs Irritant contact dermatitis is an inflammatory rash caused by direct physical or chemical injury to the skin. Unlike allergic contact dermatitis, which appears 24-72 hours after exposure to an allergen, the symptoms of irritant contact dermatitis can result within a few hours, especially with exposure to a strong irritant.  - Most individuals notice a burning or stinging sensation shortly after exposure to the irritant  as well as a rash. The rash may then become itchy.  - Common irritants causing irritant contact dermatitis include soaps and detergents, solvents, mild acids, and chemicals with a high pH (alkalis), fiberglass, hair products, bleach, rubber gloves, and plants.  Try to avoid further exposure to the irritant, if known, or protect the skin from re-exposure.  For irritated skin in body folds, consider applying a barrier cream with zinc oxide paste, such as Desitin.  - Apply petroleum jelly (eg, Vaseline) or a thick moisturizing cream (eg, CeraVe moisturizing cream) to wet skin after bathing or washing. Reapply at least twice daily to help further protect the affected skin areas.  - Start triamcinolone cream, use as directed.   - Risks, benefits, and side effects discussed. Patient understood and agrees with the plan.       Please call me if there are any changes or development of concerning symptoms (lesion/skin condition is changing, bleeding, enlarging, or worsening).

## 2024-09-17 ENCOUNTER — APPOINTMENT (OUTPATIENT)
Dept: DERMATOLOGY | Facility: CLINIC | Age: 54
End: 2024-09-17
Payer: COMMERCIAL

## 2024-09-17 DIAGNOSIS — C44.612 BASAL CELL CARCINOMA (BCC) OF SKIN OF RIGHT UPPER EXTREMITY INCLUDING SHOULDER: ICD-10-CM

## 2024-09-17 PROCEDURE — 11602 EXC TR-EXT MAL+MARG 1.1-2 CM: CPT | Performed by: STUDENT IN AN ORGANIZED HEALTH CARE EDUCATION/TRAINING PROGRAM

## 2024-09-17 PROCEDURE — 12032 INTMD RPR S/A/T/EXT 2.6-7.5: CPT | Performed by: STUDENT IN AN ORGANIZED HEALTH CARE EDUCATION/TRAINING PROGRAM

## 2024-09-17 NOTE — PROGRESS NOTES
Excision Operative Note    Date of Surgery:  9/17/2024  Surgeon:  Bk Simms MD  Office Location: [facility] [place] [id number] [address] [address]  Dept: [phone]  Dept Fax: [phone]  Referring Provider: Susan L Mayne, LUCRECIA-MAG  [address] [address]    Subjective   Debora Latif is a 54 y.o. female who presents for the following: Excision for basal cell carcinoma on the right shoulder-posterior.     According to the patient, the lesion has been present for approximately greater than 1 year at the time of diagnosis.  The lesion is itchy.  The lesion has not been treated previously.    The patient does not have a pacemaker / defibrillator.  The patient does not have a heart valve / joint replacement.    The patient is not on blood thinners.   The patient does not have a history of hepatitis B or C.  The patient does not have a history of HIV.  The patient does not have a history of immunosuppression (e.g. organ transplantation, malignancy, medications)    Is it okay to leave a phone message with results? Yes  Who else may we leave results with: (name, relationship) n/a    The following portions of the chart were reviewed this encounter and updated as appropriate:         Assessment/Plan   Pre-procedure:   Obtained informed consent: written from patient  The surgical site was identified and confirmed with the patient.     Intra-operative:   Audible time out called at : 3:10 PM 09/17/24  by: VANDANA RICHARDSON RN   Verified patient name, birthdate, site, specimen bottle label & requisition.    The planned procedure(s) was again reviewed with the patient. The risks of bleeding, infection, nerve damage and scarring were reviewed. The patient identity, surgical site, and planned procedure(s) were verified.     Biopsy Accession Number: [id number]  Basal cell carcinoma (BCC) of skin of right upper extremity including shoulder  Right Shoulder -  Posterior    Skin excision    Lesion length (cm):  1  Lesion width (cm):  1  Margin per side (cm):  0.4  Total excision diameter (cm):  1.8  Informed consent: discussed and consent obtained    Timeout: patient name, date of birth, surgical site, and procedure verified    Procedure prep:  Patient prepped in sterile fashion  Anesthesia: the lesion was anesthetized in a standard fashion    Anesthetic:  1% lidocaine w/ epinephrine 1-100,000 local infiltration  Instrument used: #15 blade    Hemostasis achieved with: electrodesiccation    Outcome: patient tolerated procedure well with no complications    Post-procedure details: sterile dressing applied and wound care instructions given    Dressing type: pressure dressing    Additional details:  The nature of the diagnosis was explained. The lesion is a skin cancer.  It is locally aggressive but has a very low to non-existent risk of spreading.  The condition is associated with sun exposure.  Warning signs of non-melanoma skin cancer discussed. Patient was instructed to perform monthly self skin examination.  We recommended that the patient have regular full skin exams given an increased risk of subsequent skin cancers. The patient was instructed to use sun protective behaviors including use of broad spectrum sunscreens and sun protective clothing to reduce risk of skin cancers.  Excision was discussed with the patient. The risks, benefits and potential adverse effects were reviewed. Discussion included but was not limited to the cure rate, relative cost, wound care requirements, activity restrictions, likely scar outcome and time to heal were reviewed. It was explained that the scar would be longer than the original lesion.  The patient elected to proceed with exicision today.    Skin repair  Complexity:  Intermediate  Final length (cm):  3.5  Informed consent: discussed and consent obtained    Timeout: patient name, date of birth, surgical site, and procedure verified     Procedure prep:  Patient prepped in sterile fashion  Anesthesia: the lesion was anesthetized in a standard fashion    Anesthetic:  1% lidocaine w/ epinephrine 1-100,000 local infiltration  Reason for type of repair: reduce tension to allow closure    Undermining: edges undermined    Subcutaneous layers (deep stitches):   Suture size:  3-0  Suture type: Vicryl (polyglactin 910)    Stitches:  Buried vertical mattress  Fine/surface layer approximation (top stitches):   Suture size:  5-0  Suture type: fast-absorbing plain gut    Stitches: simple interrupted and simple running    Hemostasis achieved with: electrodesiccation  Outcome: patient tolerated procedure well with no complications    Post-procedure details: sterile dressing applied and wound care instructions given    Dressing type: pressure dressing      Staff Communication: Dermatology Local Anesthesia: 1 % Lidocaine / Epinephrine - Amount:9cc    Specimen 1 - Dermatopathology- DERM LAB  Differential Diagnosis: Basal Cell Carcinoma ([id number])  Check Margins Yes/No?:  yes  Comments: 4 mm margins, indication stitch at proximal tip  Dermpath Lab: Routine Histopathology (formalin-fixed tissue)      Intermediate Linear Repair:  Given the location and size of the defect, it was determined that an intermediate layered linear closure was required to restore normal anatomy and function. The repair is an intermediate closure as two layers of sutures were required. The defect was undermined extensively at the level of the subcutaneous plane. Standing cutaneous cones were removed using Burow's triangles. The wound edges were brought into close approximation with buried vertical mattress sutures. The remainder of the wound was then closed with epidermal top sutures.    The final repair measured 3.5 cm      Wound care was discussed, and the patient was given written post-operative wound care instructions.      The patient will follow up with Bk Simms MD as needed for  any post operative problems or concerns, and will follow up with their primary dermatologist as scheduled.

## 2024-09-20 PROBLEM — C44.91 CANCER OF THE SKIN, BASAL CELL: Status: ACTIVE | Noted: 2024-09-20

## 2024-10-16 DIAGNOSIS — I10 HYPERTENSION, UNSPECIFIED TYPE: ICD-10-CM

## 2024-10-16 DIAGNOSIS — F31.81 BIPOLAR 2 DISORDER (MULTI): ICD-10-CM

## 2024-10-16 DIAGNOSIS — R41.3 MEMORY DEFICIT: ICD-10-CM

## 2024-10-16 RX ORDER — MEMANTINE HYDROCHLORIDE 10 MG/1
10 TABLET ORAL 2 TIMES DAILY
Qty: 180 TABLET | Refills: 0 | Status: SHIPPED | OUTPATIENT
Start: 2024-10-16

## 2024-10-16 RX ORDER — ARIPIPRAZOLE 10 MG/1
10 TABLET ORAL DAILY
Qty: 90 TABLET | Refills: 0 | Status: SHIPPED | OUTPATIENT
Start: 2024-10-16

## 2024-10-16 RX ORDER — ESCITALOPRAM OXALATE 20 MG/1
20 TABLET ORAL DAILY
Qty: 90 TABLET | Refills: 0 | Status: SHIPPED | OUTPATIENT
Start: 2024-10-16

## 2024-10-16 RX ORDER — METOPROLOL SUCCINATE 50 MG/1
50 TABLET, EXTENDED RELEASE ORAL DAILY
Qty: 90 TABLET | Refills: 0 | Status: SHIPPED | OUTPATIENT
Start: 2024-10-16

## 2024-10-18 ENCOUNTER — APPOINTMENT (OUTPATIENT)
Dept: DERMATOLOGY | Facility: CLINIC | Age: 54
End: 2024-10-18
Payer: COMMERCIAL

## 2024-10-18 VITALS — SYSTOLIC BLOOD PRESSURE: 129 MMHG | HEART RATE: 70 BPM | DIASTOLIC BLOOD PRESSURE: 89 MMHG

## 2024-10-18 DIAGNOSIS — C44.619 BASAL CELL CARCINOMA (BCC) OF SKIN OF LEFT UPPER EXTREMITY INCLUDING SHOULDER: ICD-10-CM

## 2024-10-18 PROCEDURE — 17313 MOHS 1 STAGE T/A/L: CPT | Performed by: STUDENT IN AN ORGANIZED HEALTH CARE EDUCATION/TRAINING PROGRAM

## 2024-10-18 PROCEDURE — 12032 INTMD RPR S/A/T/EXT 2.6-7.5: CPT | Performed by: STUDENT IN AN ORGANIZED HEALTH CARE EDUCATION/TRAINING PROGRAM

## 2024-10-18 NOTE — PROGRESS NOTES
Mohs Surgery Operative Note    Date of Surgery:  10/18/2024  Surgeon:  Bk Simms MD  Office Location:  [address] [facility]  [address] [address]  Dept: [phone]  Dept Fax: [phone]  Referring Provider: Susan L Mayne, LUCRECIA-MAG  [address] [address]      Assessment/Plan   Pre-procedure:   Obtained informed consent: written from patient  The surgical site was identified and confirmed with the patient.     Intra-operative:   Audible time out called at : 10:12 AM 10/18/24  by: VANDANA RICHARDSON RN   Verified patient name, birthdate, site, specimen bottle label & requisition.    The planned procedure(s) was again reviewed with the patient. The risks of bleeding, infection, nerve damage and scarring were reviewed. Written authorization was obtained. The patient identity, surgical site, and planned procedure(s) were verified. The provider acted as both surgeon and pathologist.     Basal cell carcinoma (BCC) of skin of left upper extremity including shoulder  Left Posterior Shoulder    Mohs surgery    Consent obtained: written    Universal Protocol:  Procedure explained and questions answered to patient or proxy's satisfaction: Yes    Test results available and properly labeled: Yes    Pathology report reviewed: Yes    External notes reviewed: Yes    Photo or diagram used for site identification: Yes    Site/side marked: Yes    Slide independently reviewed by Mohs surgeon: Yes    Immediately prior to procedure a time out was called: Yes    Patient identity confirmed: verbally with patient  Preparation: Patient was prepped and draped in usual sterile fashion      Anticoagulation:  Is the patient taking prescription anticoagulant and/or aspirin prescribed/recommended by a physician? No    Was the anticoagulation regimen changed prior to Mohs? No      Anesthesia:  Anesthesia method: local infiltration  Local anesthetic: lidocaine 1% WITH  epi    Procedure Details:  Case ID Number: [id number]  Biopsy accession number:   Date of biopsy: 1/18/2022  Pre-Op diagnosis: basal cell carcinoma  BCC subtype: infiltrative  Surgical site (from skin exam): Left Posterior Shoulder  Pre-operative length (cm): 2  Pre-operative width (cm): 2.5  Indications for Mohs surgery: aggressive histology  Previously treated? No      Micrographic Surgery Details:  Post-operative length (cm): 2.2  Post-operative width (cm): 3.6  Number of Mohs stages: 1    Stage 1     Comments: The patient was brought into the operating room and placed in the procedure chair in the appropriate position.  The area positive by previous biopsy was identified and confirmed with the patient. The area of clinically obvious tumor was debulked using a curette and/or scalpel as needed. An incision was made following the Mohs approach through the skin. The specimen was taken to the lab, divided into 4 piece(s) and appropriately chromacoded and processed.                 Tumor features identified on Mohs section: no tumor identified    Depth of defect: subcutaneous fat    Patient tolerance of procedure: tolerated well, no immediate complications    Reconstruction:  Was the defect reconstructed? Yes    Was reconstruction performed by the same Mohs surgeon? Yes    Setting of reconstruction: outpatient office  When was reconstruction performed? same day  Type of reconstruction: linear  Linear reconstruction: intermediate  Length of linear repair (cm): 4.7  Subcutaneous Layers (Deep Stitches)   Suture size:  3-0  Suture type:  Vicryl  Stitches:  Buried vertical mattress  Fine/surface layer approximation (top stitches)   Epidermal/Superficial suture size:  4-0  Epidermal/Superficial suture type:  Prolene  Stitches: simple running    Hemostasis achieved with: electrodesiccation  Outcome: patient tolerated procedure well with no complications    Post-procedure details: sterile dressing applied and wound care  instructions given    Dressing type: pressure dressing      Staff Communication: Dermatology Local Anesthesia: 1 % Lidocaine / Epinephrine - Amount:12cc        Intermediate Linear Repair:  Given the location and size of the defect, it was determined that an intermediate layered linear closure was required to restore normal anatomy and function. The repair is an intermediate closure as two layers of sutures were required. The defect was undermined extensively at the level of the subcutaneous plane. Standing cutaneous cones were removed using Burow's triangles. The wound edges were brought into close approximation with buried vertical mattress sutures. The remainder of the wound was then closed with epidermal top sutures.    The final repair measured 4.7 cm            Wound care was discussed, and the patient was given written post-operative wound care instructions.  The patient requested that her sutures be removed by nursing at her workplace. She understands her top sutures should be removed in 10 days.     The patient will follow up with Bk Simms MD as needed for any post operative problems or concerns, and will follow up with their primary dermatologist as scheduled.

## 2024-10-18 NOTE — PROGRESS NOTES
Office Visit Note  Date: 10/18/2024  Surgeon:  Bk Simms MD  Office Location:  [address] [place] [id number] [address]  Dept: [phone]  Dept Fax: [phone]  Referring Provider: Susan L Mayne, LUCRECIA-MAG  [address] [address]    Subjective   Debora Latif is a 54 y.o. female who presents for the following: MOHS Surgery    According to the patient, the lesion has been present for approximately greater than 1 year at the time of diagnosis.  The lesion is not causing symptoms.  The lesion has not been treated previously.    The patient does not have a pacemaker / defibrillator.  The patient does not have a heart valve / joint replacement.    The patient is not on blood thinners.  The patient does have a history of hepatitis A.  The patient does not have a history of HIV.  The patient does not have a history of immunosuppression (e.g. organ transplantation, malignancy, medications)    Review of Systems:  No other skin or systemic complaints other than what is documented elsewhere in the note.    MEDICAL HISTORY: clinically relevant history including significant past medical history, medications and allergies was reviewed and documented in Epic.    Objective   Well appearing patient in no apparent distress; mood and affect are within normal limits.  Vital signs: See record.  Noted on the Left Posterior Shoulder  Is a 2.0 x 2.5 cm scar        The patient confirmed the identified site.    Discussion:  The nature of the diagnosis was explained. The lesion is a skin cancer.  It has a risk of local growth and distant spread. The condition is associated with sun exposure.  Warning signs of non-melanoma skin cancer discussed. Patient was instructed to perform monthly self skin examination.  We recommended that the patient have regular full skin exams given an increased risk of subsequent skin cancers. The patient was instructed to use sun  protective behaviors including use of broad spectrum sunscreens and sun protective clothing to reduce risk of skin cancers.      Risks, benefits, side effects of Mohs surgery were discussed with patient and the patient voiced understanding.  It was explained that even though the cure rate of Mohs is very high it is not 100%. Risks of surgery including but not limited to bleeding, infection, numbness, nerve damage, and scar were reviewed.  Discussion included wound care requirements, activity restrictions, likely scar outcome and time to heal.     After Mohs surgery, the defect may need to be repaired surgically and the scar may be longer than the original lesion.  Reconstruction options, risks, and benefits were reviewed including second intention healing, linear repair (4-1 ratio was explained), local flaps, skin grafts, cartilage grafts and interpolation flaps (the need for multiple surgeries was explained). Possible outcomes were reviewed including likely scar appearance, failure of flap survival, infection, bleeding and the need for revision surgery.     The pathology was reviewed, the photograph was reviewed, and the referring physician's note was reviewed.    Patient elected for Mohs surgery.

## 2024-10-22 ASSESSMENT — ENCOUNTER SYMPTOMS
FEVER: 0
VOMITING: 0
RHINORRHEA: 1
SORE THROAT: 0
NAIL CHANGES: 0
COUGH: 0
SHORTNESS OF BREATH: 0
FATIGUE: 0
ANOREXIA: 0
EYE PAIN: 0

## 2024-10-24 ENCOUNTER — APPOINTMENT (OUTPATIENT)
Dept: PRIMARY CARE | Facility: CLINIC | Age: 54
End: 2024-10-24
Payer: COMMERCIAL

## 2024-10-24 VITALS
OXYGEN SATURATION: 94 % | WEIGHT: 205.2 LBS | SYSTOLIC BLOOD PRESSURE: 128 MMHG | RESPIRATION RATE: 18 BRPM | HEART RATE: 90 BPM | HEIGHT: 66 IN | TEMPERATURE: 95.5 F | BODY MASS INDEX: 32.98 KG/M2 | DIASTOLIC BLOOD PRESSURE: 80 MMHG

## 2024-10-24 DIAGNOSIS — I10 HYPERTENSION, UNSPECIFIED TYPE: ICD-10-CM

## 2024-10-24 DIAGNOSIS — R21 RASH: Primary | ICD-10-CM

## 2024-10-24 DIAGNOSIS — F31.81 BIPOLAR 2 DISORDER (MULTI): ICD-10-CM

## 2024-10-24 DIAGNOSIS — B37.31 VAGINAL YEAST INFECTION: ICD-10-CM

## 2024-10-24 DIAGNOSIS — R41.3 MEMORY DEFICIT: ICD-10-CM

## 2024-10-24 PROCEDURE — 99213 OFFICE O/P EST LOW 20 MIN: CPT | Performed by: FAMILY MEDICINE

## 2024-10-24 PROCEDURE — 3074F SYST BP LT 130 MM HG: CPT | Performed by: FAMILY MEDICINE

## 2024-10-24 PROCEDURE — 3079F DIAST BP 80-89 MM HG: CPT | Performed by: FAMILY MEDICINE

## 2024-10-24 PROCEDURE — 3008F BODY MASS INDEX DOCD: CPT | Performed by: FAMILY MEDICINE

## 2024-10-24 PROCEDURE — 1036F TOBACCO NON-USER: CPT | Performed by: FAMILY MEDICINE

## 2024-10-24 RX ORDER — METOPROLOL SUCCINATE 50 MG/1
50 TABLET, EXTENDED RELEASE ORAL DAILY
Qty: 90 TABLET | Refills: 0 | Status: SHIPPED | OUTPATIENT
Start: 2024-10-24

## 2024-10-24 RX ORDER — MEMANTINE HYDROCHLORIDE 10 MG/1
10 TABLET ORAL 2 TIMES DAILY
Qty: 180 TABLET | Refills: 0 | Status: SHIPPED | OUTPATIENT
Start: 2024-10-24

## 2024-10-24 RX ORDER — ARIPIPRAZOLE 10 MG/1
10 TABLET ORAL DAILY
Qty: 90 TABLET | Refills: 0 | Status: SHIPPED | OUTPATIENT
Start: 2024-10-24

## 2024-10-24 RX ORDER — FLUCONAZOLE 150 MG/1
150 TABLET ORAL DAILY
Qty: 3 TABLET | Refills: 0 | Status: SHIPPED | OUTPATIENT
Start: 2024-10-24 | End: 2024-10-27

## 2024-10-24 RX ORDER — ESCITALOPRAM OXALATE 20 MG/1
20 TABLET ORAL DAILY
Qty: 90 TABLET | Refills: 0 | Status: SHIPPED | OUTPATIENT
Start: 2024-10-24

## 2024-10-24 RX ORDER — PREDNISONE 20 MG/1
40 TABLET ORAL DAILY
Qty: 14 TABLET | Refills: 0 | Status: SHIPPED | OUTPATIENT
Start: 2024-10-24 | End: 2024-10-31

## 2024-10-24 ASSESSMENT — ENCOUNTER SYMPTOMS
EYE PAIN: 0
FATIGUE: 0
ANOREXIA: 0
RHINORRHEA: 1
SORE THROAT: 0
COUGH: 0
FEVER: 0
SHORTNESS OF BREATH: 0
NAIL CHANGES: 0
VOMITING: 0

## 2024-10-24 NOTE — PROGRESS NOTES
"Subjective   Patient ID: Debora Latif is a 54 y.o. female who presents for Follow-up (3 months).    Rash  This is a recurrent problem. The current episode started more than 1 month ago. The problem has been rapidly worsening since onset. The affected locations include the scalp and torso. The rash is characterized by burning and itchiness. It is unknown if there was an exposure to a precipitant. Associated symptoms include rhinorrhea. Pertinent negatives include no anorexia, congestion, cough, eye pain, facial edema, fatigue, fever, nail changes, shortness of breath, sore throat or vomiting.      She has been having skin biopies and mohs procedures lately and they have been giving her lidocaine and she thinks she is allergic to it.  Rash is all over and very itchy. Just filled her meds a few days ago. Will see derm in a couple weeks but very uncomfortable.  Also has vaginal yeast infection  Review of Systems   Constitutional:  Negative for fatigue and fever.   HENT:  Positive for rhinorrhea. Negative for congestion and sore throat.    Eyes:  Negative for pain.   Respiratory:  Negative for cough and shortness of breath.    Gastrointestinal:  Negative for anorexia and vomiting.   Skin:  Positive for rash. Negative for nail changes.   All other systems reviewed and are negative.      Objective   /80 (BP Location: Left arm, Patient Position: Sitting, BP Cuff Size: Adult)   Pulse 90   Temp 35.3 °C (95.5 °F) (Temporal)   Resp 18   Ht 1.676 m (5' 6\")   Wt 93.1 kg (205 lb 3.2 oz)   SpO2 94%   BMI 33.12 kg/m²     Physical Exam  Constitutional:       Appearance: Normal appearance.   HENT:      Head: Normocephalic.      Right Ear: Tympanic membrane normal.      Nose: Nose normal.      Mouth/Throat:      Mouth: Mucous membranes are moist.   Eyes:      Pupils: Pupils are equal, round, and reactive to light.   Cardiovascular:      Rate and Rhythm: Normal rate and regular rhythm.      Pulses: Normal pulses. "   Pulmonary:      Effort: Pulmonary effort is normal.   Abdominal:      General: Abdomen is flat.   Musculoskeletal:         General: Normal range of motion.      Cervical back: Normal range of motion.   Skin:     General: Skin is warm.   Neurological:      Mental Status: She is alert.   Psychiatric:         Mood and Affect: Mood normal.         Assessment/Plan   Diagnoses and all orders for this visit:  Rash  -     predniSONE (Deltasone) 20 mg tablet; Take 2 tablets (40 mg) by mouth once daily for 7 days.  Memory deficit  -     ARIPiprazole (Abilify) 10 mg tablet; Take 1 tablet (10 mg) by mouth once daily.  -     escitalopram (Lexapro) 20 mg tablet; Take 1 tablet (20 mg) by mouth once daily.  -     memantine (Namenda) 10 mg tablet; Take 1 tablet (10 mg) by mouth 2 times a day.  -     metoprolol succinate XL (Toprol-XL) 50 mg 24 hr tablet; Take 1 tablet (50 mg) by mouth once daily.  Bipolar 2 disorder (Multi)  -     ARIPiprazole (Abilify) 10 mg tablet; Take 1 tablet (10 mg) by mouth once daily.  -     escitalopram (Lexapro) 20 mg tablet; Take 1 tablet (20 mg) by mouth once daily.  Hypertension, unspecified type  -     metoprolol succinate XL (Toprol-XL) 50 mg 24 hr tablet; Take 1 tablet (50 mg) by mouth once daily.  Vaginal yeast infection  -     fluconazole (Diflucan) 150 mg tablet; Take 1 tablet (150 mg) by mouth once daily for 3 days.

## 2024-11-14 ENCOUNTER — APPOINTMENT (OUTPATIENT)
Dept: DERMATOLOGY | Facility: CLINIC | Age: 54
End: 2024-11-14
Payer: COMMERCIAL

## 2024-11-14 DIAGNOSIS — L20.89 OTHER ATOPIC DERMATITIS: ICD-10-CM

## 2024-11-14 DIAGNOSIS — R21 RASH AND OTHER NONSPECIFIC SKIN ERUPTION: Primary | ICD-10-CM

## 2024-11-14 PROCEDURE — 99214 OFFICE O/P EST MOD 30 MIN: CPT | Performed by: NURSE PRACTITIONER

## 2024-11-14 RX ORDER — PREDNISONE 10 MG/1
TABLET ORAL
Qty: 42 TABLET | Refills: 0 | Status: SHIPPED | OUTPATIENT
Start: 2024-11-14

## 2024-11-14 RX ORDER — HYDROXYZINE PAMOATE 25 MG/1
25 CAPSULE ORAL 3 TIMES DAILY PRN
Qty: 90 CAPSULE | Refills: 1 | Status: SHIPPED | OUTPATIENT
Start: 2024-11-14 | End: 2025-01-13

## 2024-11-14 NOTE — PROGRESS NOTES
Subjective     Debora Latif is a 54 y.o. female who presents for the following: Rash (Pt presents for re-examination of generalized itching rash. Pt states she is currently using triamcinolone with no response. ).     Review of Systems:  No other skin or systemic complaints other than what is documented elsewhere in the note.    The following portions of the chart were reviewed this encounter and updated as appropriate:         Skin Cancer History  Biopsy Date Type Location Status   7/15/24 BCC, Superficial Right Shoulder - Posterior Treatment Complete  9/17/24       Specialty Problems          Dermatology Problems    Actinic keratosis    Hemangioma of skin and subcutaneous tissue    Melanocytic nevi of trunk    Neoplasm of uncertain behavior of skin    Other seborrheic keratosis    Pigmented skin lesion suspicious for malignant neoplasm    Basal cell adenoma    Cancer of the skin, basal cell        Objective   Well appearing patient in no apparent distress; mood and affect are within normal limits.    A full examination was performed including scalp, head, eyes, ears, nose, lips, neck, chest, axillae, abdomen, back, buttocks, bilateral upper extremities, bilateral lower extremities, hands, feet, fingers, toes, fingernails, and toenails. All findings within normal limits unless otherwise noted below.    Assessment/Plan   1. Rash and other nonspecific skin eruption  Arms, trunk, medial upper legs  Scattered on upper extremities, thighs, and trunk are 1mm-2mm erythematous papules in various phases of resolution. No trigger identified. No new lesions, no recent illness, no medication changes within the past few weeks. No recent travel. No other household members affected. Not  improved with triamcinolone cream.    - Discussed the differential with Ms. Latif - Allergic vs Irritant contact dermatitis is an inflammatory rash caused by direct physical or chemical injury to the skin. Unlike allergic contact dermatitis, which  appears 24-72 hours after exposure to an allergen, the symptoms of irritant contact dermatitis can result within a few hours, especially with exposure to a strong irritant.  - Most individuals notice a burning or stinging sensation shortly after exposure to the irritant as well as a rash. The rash may then become itchy.  - Common irritants causing irritant contact dermatitis include soaps and detergents, solvents, mild acids, and chemicals with a high pH (alkalis), fiberglass, hair products, bleach, rubber gloves, and plants.  Try to avoid further exposure to the irritant, if known, or protect the skin from re-exposure.  For irritated skin in body folds, consider applying a barrier cream with zinc oxide paste, such as Desitin.  - Apply petroleum jelly (eg, Vaseline) or a thick moisturizing cream (eg, CeraVe moisturizing cream) to wet skin after bathing or washing. Reapply at least twice daily to help further protect the affected skin areas.  - Will consider a biopsy once off topical steroids for at least 2 weeks.   - Start prednisone taper, take as directed.   - Start hydroxyzine, take as directed.    - Risks, benefits, and side effects discussed. Patient understood and agrees with the plan.       Related Medications  triamcinolone (Kenalog) 0.1 % cream  Twice daily to affected areas for 1-2 weeks, then weekends only. Repeat every few months for flares.    hydrOXYzine pamoate (Vistaril) 25 mg capsule  Take 1 capsule (25 mg) by mouth 3 times a day as needed for itching.    Follow up in 1 month. Please call me if there are any changes or development of concerning symptoms (lesion/skin condition is changing, bleeding, enlarging, or worsening).

## 2024-12-13 ASSESSMENT — DERMATOLOGY QUALITY OF LIFE (QOL) ASSESSMENT
RATE HOW EMOTIONALLY BOTHERED YOU ARE BY YOUR SKIN PROBLEM (FOR EXAMPLE, WORRY, EMBARRASSMENT, FRUSTRATION): 3
RATE HOW BOTHERED YOU ARE BY SYMPTOMS OF YOUR SKIN PROBLEM (EG, ITCHING, STINGING BURNING, HURTING OR SKIN IRRITATION): 6 - ALWAYS BOTHERED
RATE HOW BOTHERED YOU ARE BY EFFECTS OF YOUR SKIN PROBLEMS ON YOUR ACTIVITIES (EG, GOING OUT, ACCOMPLISHING WHAT YOU WANT, WORK ACTIVITIES OR YOUR RELATIONSHIPS WITH OTHERS): 3
WHAT SINGLE SKIN CONDITION LISTED BELOW IS THE PATIENT ANSWERING THE QUALITY-OF-LIFE ASSESSMENT QUESTIONS ABOUT: DERMATITIS
DATE THE QUALITY-OF-LIFE ASSESSMENT WAS COMPLETED: 12/13/2024
RATE HOW BOTHERED YOU ARE BY EFFECTS OF YOUR SKIN PROBLEMS ON YOUR ACTIVITIES (EG, GOING OUT, ACCOMPLISHING WHAT YOU WANT, WORK ACTIVITIES OR YOUR RELATIONSHIPS WITH OTHERS): 3
RATE HOW BOTHERED YOU ARE BY SYMPTOMS OF YOUR SKIN PROBLEM (EG, ITCHING, STINGING BURNING, HURTING OR SKIN IRRITATION): 6 - ALWAYS BOTHERED
WHAT SINGLE SKIN CONDITION LISTED BELOW IS THE PATIENT ANSWERING THE QUALITY-OF-LIFE ASSESSMENT QUESTIONS ABOUT: DERMATITIS
RATE HOW EMOTIONALLY BOTHERED YOU ARE BY YOUR SKIN PROBLEM (FOR EXAMPLE, WORRY, EMBARRASSMENT, FRUSTRATION): 3
DATE THE QUALITY-OF-LIFE ASSESSMENT WAS COMPLETED: 67187

## 2024-12-19 ENCOUNTER — APPOINTMENT (OUTPATIENT)
Dept: DERMATOLOGY | Facility: CLINIC | Age: 54
End: 2024-12-19
Payer: COMMERCIAL

## 2024-12-19 DIAGNOSIS — L20.89 OTHER ATOPIC DERMATITIS: ICD-10-CM

## 2024-12-19 DIAGNOSIS — R21 RASH AND OTHER NONSPECIFIC SKIN ERUPTION: Primary | ICD-10-CM

## 2024-12-19 PROCEDURE — 99213 OFFICE O/P EST LOW 20 MIN: CPT | Performed by: NURSE PRACTITIONER

## 2024-12-19 PROCEDURE — 11104 PUNCH BX SKIN SINGLE LESION: CPT | Performed by: NURSE PRACTITIONER

## 2024-12-19 RX ORDER — HYDROXYZINE HYDROCHLORIDE 50 MG/1
50 TABLET, FILM COATED ORAL 3 TIMES DAILY
Qty: 90 TABLET | Refills: 1 | Status: SHIPPED | OUTPATIENT
Start: 2024-12-19 | End: 2025-02-17

## 2024-12-20 ENCOUNTER — SPECIALTY PHARMACY (OUTPATIENT)
Dept: PHARMACY | Facility: CLINIC | Age: 54
End: 2024-12-20

## 2024-12-20 NOTE — PROGRESS NOTES
Subjective     Debora Latif is a 54 y.o. female who presents for the following: Rash (1 month follow up. Pt completed prednisone taper. Pt denies improvement to rash. ).     Review of Systems:  No other skin or systemic complaints other than what is documented elsewhere in the note.    The following portions of the chart were reviewed this encounter and updated as appropriate:  Tobacco  Allergies  Meds  Problems  Med Hx  Surg Hx  Fam Hx       Skin Cancer History  Biopsy Date Type Location Status   7/15/24 BCC, Superficial Right Shoulder - Posterior Treatment Complete  9/17/24     Specialty Problems          Dermatology Problems    Actinic keratosis    Hemangioma of skin and subcutaneous tissue    Melanocytic nevi of trunk    Neoplasm of uncertain behavior of skin    Other seborrheic keratosis    Pigmented skin lesion suspicious for malignant neoplasm    Basal cell adenoma    Cancer of the skin, basal cell     Past Medical History:  Debora Latif  has a past medical history of Personal history of other mental and behavioral disorders.    Past Surgical History:  Debora Latif  has a past surgical history that includes Other surgical history (09/02/2020); Other surgical history (09/02/2020); and Other surgical history (09/02/2020).    Family History:  Patient family history is not on file.    Social History:  Debora Latif  reports that she has quit smoking. Her smoking use included cigarettes. She has never been exposed to tobacco smoke. She has never used smokeless tobacco. She reports current alcohol use. She reports that she does not use drugs.    Allergies:  Codeine    Current Medications / CAM's:    Current Outpatient Medications:     ARIPiprazole (Abilify) 10 mg tablet, Take 1 tablet (10 mg) by mouth once daily., Disp: 90 tablet, Rfl: 0    escitalopram (Lexapro) 20 mg tablet, Take 1 tablet (20 mg) by mouth once daily., Disp: 90 tablet, Rfl: 0    hydrOXYzine HCL (Atarax) 50 mg tablet, Take 1 tablet (50 mg) by  mouth 3 times a day., Disp: 90 tablet, Rfl: 1    hydrOXYzine pamoate (Vistaril) 25 mg capsule, Take 1 capsule (25 mg) by mouth 3 times a day as needed for itching., Disp: 90 capsule, Rfl: 1    memantine (Namenda) 10 mg tablet, Take 1 tablet (10 mg) by mouth 2 times a day. (Patient not taking: Reported on 12/19/2024), Disp: 180 tablet, Rfl: 0    metoprolol succinate XL (Toprol-XL) 50 mg 24 hr tablet, Take 1 tablet (50 mg) by mouth once daily., Disp: 90 tablet, Rfl: 0    predniSONE (Deltasone) 10 mg tablet, Take 4 tabs in AM for 3 days, then 3 tabs in AM for 3 days, then 2 tabs in AM for 7 days, then 1 tab in AM for 7 days., Disp: 42 tablet, Rfl: 0    triamcinolone (Kenalog) 0.1 % cream, Twice daily to affected areas for 1-2 weeks, then weekends only. Repeat every few months for flares., Disp: 453 g, Rfl: 1     Objective   Well appearing patient in no apparent distress; mood and affect are within normal limits.    A focused skin examination was performed. All findings within normal limits unless otherwise noted below.    Assessment/Plan   1. Rash and other nonspecific skin eruption  Left Upper Back  Erythematous, scaly papules coalescing to plaques.               -  Discussed differential with patient.   - Given uncertainty of clinical diagnosis, a biopsy is recommended in clinic today.   - The patient expressed understanding, is in agreement with this plan, and wishes to proceed with biopsy.   - Oral and written wound care instructions provided.   - Advised the patient that the office will call within 2 weeks to discuss biopsy results.     Lesion biopsy - Left Upper Back  Type of biopsy: punch    Informed consent: discussed and consent obtained    Timeout: patient name, date of birth, surgical site, and procedure verified    Procedure prep:  Patient was prepped and draped  Anesthesia: the lesion was anesthetized in a standard fashion    Anesthetic:  1% lidocaine w/ epinephrine 1-100,000 local infiltration  Punch  size:  4 mm  Suture size:  4-0  Suture type: Prolene (polypropylene)    Suture removal (days):  14  Hemostasis achieved with: suture    Outcome: patient tolerated procedure well    Post-procedure details: wound care instructions given      Specimen 1 - Dermatopathology- DERM LAB  Differential Diagnosis: contact dermatitis vs other  Check Margins Yes/No?:    Comments:    Dermpath Lab: Routine Histopathology (formalin-fixed tissue)    Related Medications  triamcinolone (Kenalog) 0.1 % cream  Twice daily to affected areas for 1-2 weeks, then weekends only. Repeat every few months for flares.    hydrOXYzine pamoate (Vistaril) 25 mg capsule  Take 1 capsule (25 mg) by mouth 3 times a day as needed for itching.    hydrOXYzine HCL (Atarax) 50 mg tablet  Take 1 tablet (50 mg) by mouth 3 times a day.    2. Other atopic dermatitis  Erythematous scaly papules and plaques with overyling excoriation. BSA>40%, flares during winter months with 10/10 pruritus. Has personal history of atopic dermatitis. Tried prednisone, clobetasol cream, hydrocortisone cream, and tacrolimus ointment.     -Discussed nature of diagnosis and treatment options  -When the rash is active, apply topical corticosteroids to the active areas of the rash as prescribed  -Recommend to use liberal emollients twice daily, one time applied immediately after shower while skin is still slightly damp. Use emollients to all areas of the body that may be affected and use whether the rash is active or not. Use prescription medications before applying emollients.  -Discussed with/information given to the patient on the risks, benefits and alternatives of the usage of topical corticosteroids, including but not limited to: atrophy (thinning of the skin), striae (stretch marks), telangiectasia (blood vessel growth), and dyspigmentation (discoloration of the skin).  -Recommend to limit long-term use of topical corticosteroids to less than 14 days per month to reduce risk of  side effects.  -Recommend: Discussed treatment options, will start Dupixent. Answered all questions/concerns.   - Risks, benefits, and side effects discussed. Patient understood and agrees with the plan.       Related Medications  predniSONE (Deltasone) 10 mg tablet  Take 4 tabs in AM for 3 days, then 3 tabs in AM for 3 days, then 2 tabs in AM for 7 days, then 1 tab in AM for 7 days.      Other Procedures Placed This Encounter  Staff Communication: Dermatology Local Anesthesia: 1 % Lidocaine / Epinephrine - Amount:    Please call me if there are any changes or development of concerning symptoms (lesion/skin condition is changing, bleeding, enlarging, or worsening).

## 2024-12-26 PROCEDURE — RXMED WILLOW AMBULATORY MEDICATION CHARGE

## 2025-01-06 ASSESSMENT — DERMATOLOGY QUALITY OF LIFE (QOL) ASSESSMENT
RATE HOW BOTHERED YOU ARE BY EFFECTS OF YOUR SKIN PROBLEMS ON YOUR ACTIVITIES (EG, GOING OUT, ACCOMPLISHING WHAT YOU WANT, WORK ACTIVITIES OR YOUR RELATIONSHIPS WITH OTHERS): 4
RATE HOW BOTHERED YOU ARE BY SYMPTOMS OF YOUR SKIN PROBLEM (EG, ITCHING, STINGING BURNING, HURTING OR SKIN IRRITATION): 6 - ALWAYS BOTHERED
RATE HOW EMOTIONALLY BOTHERED YOU ARE BY YOUR SKIN PROBLEM (FOR EXAMPLE, WORRY, EMBARRASSMENT, FRUSTRATION): 4
RATE HOW BOTHERED YOU ARE BY SYMPTOMS OF YOUR SKIN PROBLEM (EG, ITCHING, STINGING BURNING, HURTING OR SKIN IRRITATION): 6 - ALWAYS BOTHERED
DATE THE QUALITY-OF-LIFE ASSESSMENT WAS COMPLETED: 66992
WHAT SINGLE SKIN CONDITION LISTED BELOW IS THE PATIENT ANSWERING THE QUALITY-OF-LIFE ASSESSMENT QUESTIONS ABOUT: DERMATITIS
RATE HOW BOTHERED YOU ARE BY EFFECTS OF YOUR SKIN PROBLEMS ON YOUR ACTIVITIES (EG, GOING OUT, ACCOMPLISHING WHAT YOU WANT, WORK ACTIVITIES OR YOUR RELATIONSHIPS WITH OTHERS): 4
RATE HOW EMOTIONALLY BOTHERED YOU ARE BY YOUR SKIN PROBLEM (FOR EXAMPLE, WORRY, EMBARRASSMENT, FRUSTRATION): 4
WHAT SINGLE SKIN CONDITION LISTED BELOW IS THE PATIENT ANSWERING THE QUALITY-OF-LIFE ASSESSMENT QUESTIONS ABOUT: DERMATITIS

## 2025-01-07 ENCOUNTER — SPECIALTY PHARMACY (OUTPATIENT)
Dept: PHARMACY | Facility: CLINIC | Age: 55
End: 2025-01-07

## 2025-01-08 ENCOUNTER — APPOINTMENT (OUTPATIENT)
Dept: DERMATOLOGY | Facility: CLINIC | Age: 55
End: 2025-01-08
Payer: COMMERCIAL

## 2025-01-08 ENCOUNTER — PHARMACY VISIT (OUTPATIENT)
Dept: PHARMACY | Facility: CLINIC | Age: 55
End: 2025-01-08
Payer: COMMERCIAL

## 2025-01-08 DIAGNOSIS — Z12.83 ENCOUNTER FOR SCREENING FOR MALIGNANT NEOPLASM OF SKIN: ICD-10-CM

## 2025-01-08 DIAGNOSIS — D48.5 NEOPLASM OF UNCERTAIN BEHAVIOR OF SKIN: Primary | ICD-10-CM

## 2025-01-08 DIAGNOSIS — L81.4 LENTIGO: ICD-10-CM

## 2025-01-08 DIAGNOSIS — D22.9 MELANOCYTIC NEVUS, UNSPECIFIED LOCATION: ICD-10-CM

## 2025-01-08 DIAGNOSIS — D18.01 HEMANGIOMA OF SKIN: ICD-10-CM

## 2025-01-08 DIAGNOSIS — L82.1 SEBORRHEIC KERATOSIS: ICD-10-CM

## 2025-01-08 DIAGNOSIS — L30.9 DERMATITIS: ICD-10-CM

## 2025-01-08 DIAGNOSIS — Z12.83 SCREENING EXAM FOR SKIN CANCER: ICD-10-CM

## 2025-01-08 PROCEDURE — 99213 OFFICE O/P EST LOW 20 MIN: CPT | Performed by: NURSE PRACTITIONER

## 2025-01-08 PROCEDURE — 11102 TANGNTL BX SKIN SINGLE LES: CPT | Performed by: NURSE PRACTITIONER

## 2025-01-08 PROCEDURE — 1036F TOBACCO NON-USER: CPT | Performed by: NURSE PRACTITIONER

## 2025-01-08 RX ORDER — FLUOCINOLONE ACETONIDE 0.11 MG/ML
OIL TOPICAL 3 TIMES DAILY
Qty: 118 ML | Refills: 3 | Status: SHIPPED | OUTPATIENT
Start: 2025-01-08 | End: 2026-01-08

## 2025-01-08 NOTE — PROGRESS NOTES
Subjective     Debora Latif is a 54 y.o. female who presents for the following: Skin Check (Pt presents to office for full skin exam.  Last full skin exam 07/2024.  Pt has history of NMSC.  Pt has scattered lesions of concern at this time. Chaperone offered and declined. /) and Rash (Pt states rash is still present. Upon chart review it appears that loading dose of dupixent was sent 01/08/2025).     Review of Systems:  No other skin or systemic complaints other than what is documented elsewhere in the note.    The following portions of the chart were reviewed this encounter and updated as appropriate:  Tobacco  Allergies  Meds  Problems  Med Hx  Surg Hx  Fam Hx       Skin Cancer History  Biopsy Date Type Location Status   7/15/24 BCC, Superficial Right Shoulder - Posterior Treatment Complete  9/17/24     Specialty Problems          Dermatology Problems    Actinic keratosis    Hemangioma of skin and subcutaneous tissue    Melanocytic nevi of trunk    Neoplasm of uncertain behavior of skin    Other seborrheic keratosis    Pigmented skin lesion suspicious for malignant neoplasm    Basal cell adenoma    Cancer of the skin, basal cell     Past Medical History:  Debora Latif  has a past medical history of Personal history of other mental and behavioral disorders.    Past Surgical History:  Debora Latif  has a past surgical history that includes Other surgical history (09/02/2020); Other surgical history (09/02/2020); and Other surgical history (09/02/2020).    Family History:  Patient family history is not on file.    Social History:  Debora Latif  reports that she has quit smoking. Her smoking use included cigarettes. She has never been exposed to tobacco smoke. She has never used smokeless tobacco. She reports current alcohol use. She reports that she does not use drugs.    Allergies:  Codeine    Current Medications / CAM's:    Current Outpatient Medications:     ARIPiprazole (Abilify) 10 mg tablet, Take 1 tablet  (10 mg) by mouth once daily., Disp: 90 tablet, Rfl: 0    dupilumab (Dupixent) 300 mg/2 mL pen injector, Inject 2 mL (300 mg) under the skin every 14 (fourteen) days., Disp: 4 mL, Rfl: 9    dupilumab (Dupixent) 300 mg/2 mL pen injector, Inject 4 mL (600 mg) under the skin 1 time for 1 dose., Disp: 4 mL, Rfl: 0    escitalopram (Lexapro) 20 mg tablet, Take 1 tablet (20 mg) by mouth once daily., Disp: 90 tablet, Rfl: 0    hydrOXYzine HCL (Atarax) 50 mg tablet, Take 1 tablet (50 mg) by mouth 3 times a day., Disp: 90 tablet, Rfl: 1    hydrOXYzine pamoate (Vistaril) 25 mg capsule, Take 1 capsule (25 mg) by mouth 3 times a day as needed for itching., Disp: 90 capsule, Rfl: 1    memantine (Namenda) 10 mg tablet, Take 1 tablet (10 mg) by mouth 2 times a day. (Patient not taking: Reported on 12/19/2024), Disp: 180 tablet, Rfl: 0    metoprolol succinate XL (Toprol-XL) 50 mg 24 hr tablet, Take 1 tablet (50 mg) by mouth once daily., Disp: 90 tablet, Rfl: 0    predniSONE (Deltasone) 10 mg tablet, Take 4 tabs in AM for 3 days, then 3 tabs in AM for 3 days, then 2 tabs in AM for 7 days, then 1 tab in AM for 7 days., Disp: 42 tablet, Rfl: 0    triamcinolone (Kenalog) 0.1 % cream, Twice daily to affected areas for 1-2 weeks, then weekends only. Repeat every few months for flares., Disp: 453 g, Rfl: 1     Objective   Well appearing patient in no apparent distress; mood and affect are within normal limits.    A focused skin examination was performed. All findings within normal limits unless otherwise noted below.    Assessment/Plan   1. Neoplasm of uncertain behavior of skin  Left Ala Nasi  Erythematous 2 mm scaly papule.              Lesion biopsy  Type of biopsy: tangential    Informed consent: discussed and consent obtained    Timeout: patient name, date of birth, surgical site, and procedure verified    Procedure prep:  Patient was prepped and draped  Anesthesia: the lesion was anesthetized in a standard fashion    Anesthetic:  1%  lidocaine w/ epinephrine 1-100,000 local infiltration  Instrument used: DermaBlade    Hemostasis achieved with: aluminum chloride    Outcome: patient tolerated procedure well    Post-procedure details: sterile dressing applied and wound care instructions given    Dressing type: petrolatum and bandage      Specimen 1 - Dermatopathology- DERM LAB  Differential Diagnosis: r/o nmsc  Check Margins Yes/No?:    Comments:    Dermpath Lab: Routine Histopathology (formalin-fixed tissue)    -  Discussed differential with patient.   - Given uncertainty of clinical diagnosis, a biopsy is recommended in clinic today.   - The patient expressed understanding, is in agreement with this plan, and wishes to proceed with biopsy.   - Oral and written wound care instructions provided.   - Advised the patient that the office will call within 2 weeks to discuss biopsy results.     2. Encounter for screening for malignant neoplasm of skin    3. Screening exam for skin cancer  Scattered benign lesions. Scar(s) clear no sign of recurrence.     No evidence of recurrence in scar and benign ROS.   Continue with regular total body skin exams.  ABCDEs of melanoma and atypical moles were discussed with the patient.  Patient was instructed to perform monthly self skin examination.  We recommended that the patient have regular full skin exams given an increased risk of subsequent skin cancers.  The patient was instructed to use sun protective behaviors including use of broad spectrum sunscreens and sun protective clothing to reduce risk of skin cancers.    4. Melanocytic nevus, unspecified location  Uniform pigmented macule(s)/papule(s) with reassuring findings on dermoscopy    -Discussed nature of condition  -Reassurance, benign-appearing features on examination today  -Recommend continued observation    5. Seborrheic keratosis  Stuck on verrucous, tan-brown papules and plaques.      Although Seborrheic Keratoses can be troublesome and unsightly,  they are entirely benign.  Removal of Seborrheic Keratoses is considered a cosmetic procedure. Removal is typically performed using liquid nitrogen cryotherapy.  Treatment of current lesions does not prevent the development of new Seborrheic Keratoses in the future.    6. Hemangioma of skin  Violaceous/red papule with maroon lagoons     - A cherry hemangioma is a small macule (small, flat, smooth area) or papule (small, solid bump) formed from an overgrowth of tiny blood vessels in the skin. Cherry hemangiomas are characteristically red or purplish in color. They often first appear in middle adulthood and usually increase in number with age. Cherry hemangiomas are noncancerous (benign) and are common in adults.  - Lesions are benign, reassured patient.     7. Lentigo  Scattered tan macules in sun-exposed areas.    A solar lentigo (plural, solar lentigines), sometimes called an age spot or liver spot, is a brown macule (small, flat, smooth area of skin) caused by chronic sun or artificial ultraviolet (UV) light exposure. There may be just one lentigo or there may be multiple. This type of lentigo is different from lentigo simplex (discussed separately) because it is caused by exposure to UV light. Solar lentigines are benign, but they do indicate excessive sun exposure, a risk factor for the development of skin cancer.  Lesions are benign, no treatment needed.       Follow up in 6 months for a total body skin exam. Please call me if there are any changes or development of concerning symptoms (lesion/skin condition is changing, bleeding, enlarging, or worsening).

## 2025-01-11 PROBLEM — C44.311 BASAL CELL CARCINOMA (BCC) OF SKIN OF NOSE: Status: ACTIVE | Noted: 2025-01-11

## 2025-01-14 DIAGNOSIS — C44.311 BASAL CELL CARCINOMA (BCC) OF SKIN OF NOSE: ICD-10-CM

## 2025-01-14 DIAGNOSIS — C44.92 SCC (SQUAMOUS CELL CARCINOMA): ICD-10-CM

## 2025-01-14 NOTE — RESULT ENCOUNTER NOTE
Pt was contacted and notified of malignant results at this time.  Pt is agreeable to treatment plan. No further questions noted. Referral place today.      Doug Mosley LPN

## 2025-01-16 ENCOUNTER — APPOINTMENT (OUTPATIENT)
Dept: DERMATOLOGY | Facility: CLINIC | Age: 55
End: 2025-01-16
Payer: COMMERCIAL

## 2025-01-23 ENCOUNTER — APPOINTMENT (OUTPATIENT)
Dept: PRIMARY CARE | Facility: CLINIC | Age: 55
End: 2025-01-23
Payer: COMMERCIAL

## 2025-01-23 VITALS
WEIGHT: 203 LBS | HEIGHT: 66 IN | BODY MASS INDEX: 32.62 KG/M2 | DIASTOLIC BLOOD PRESSURE: 88 MMHG | SYSTOLIC BLOOD PRESSURE: 130 MMHG

## 2025-01-23 DIAGNOSIS — R92.8 ABNORMAL MAMMOGRAM OF BOTH BREASTS: ICD-10-CM

## 2025-01-23 DIAGNOSIS — R41.3 MEMORY DEFICIT: ICD-10-CM

## 2025-01-23 DIAGNOSIS — F31.81 BIPOLAR 2 DISORDER (MULTI): ICD-10-CM

## 2025-01-23 DIAGNOSIS — I10 HYPERTENSION, UNSPECIFIED TYPE: ICD-10-CM

## 2025-01-23 DIAGNOSIS — I47.10 PAROXYSMAL SUPRAVENTRICULAR TACHYCARDIA (CMS-HCC): Primary | ICD-10-CM

## 2025-01-23 PROCEDURE — 3079F DIAST BP 80-89 MM HG: CPT | Performed by: FAMILY MEDICINE

## 2025-01-23 PROCEDURE — 99213 OFFICE O/P EST LOW 20 MIN: CPT | Performed by: FAMILY MEDICINE

## 2025-01-23 PROCEDURE — 1036F TOBACCO NON-USER: CPT | Performed by: FAMILY MEDICINE

## 2025-01-23 PROCEDURE — 3008F BODY MASS INDEX DOCD: CPT | Performed by: FAMILY MEDICINE

## 2025-01-23 PROCEDURE — 3075F SYST BP GE 130 - 139MM HG: CPT | Performed by: FAMILY MEDICINE

## 2025-01-23 RX ORDER — ESCITALOPRAM OXALATE 20 MG/1
20 TABLET ORAL DAILY
Qty: 90 TABLET | Refills: 0 | Status: SHIPPED | OUTPATIENT
Start: 2025-01-23

## 2025-01-23 RX ORDER — METOPROLOL SUCCINATE 50 MG/1
50 TABLET, EXTENDED RELEASE ORAL DAILY
Qty: 90 TABLET | Refills: 0 | Status: SHIPPED | OUTPATIENT
Start: 2025-01-23

## 2025-01-23 RX ORDER — MEMANTINE HYDROCHLORIDE 10 MG/1
10 TABLET ORAL 2 TIMES DAILY
Qty: 180 TABLET | Refills: 0 | Status: SHIPPED | OUTPATIENT
Start: 2025-01-23

## 2025-01-23 RX ORDER — ARIPIPRAZOLE 10 MG/1
10 TABLET ORAL DAILY
Qty: 90 TABLET | Refills: 0 | Status: SHIPPED | OUTPATIENT
Start: 2025-01-23

## 2025-01-23 NOTE — PROGRESS NOTES
"Subjective   Patient ID: Debora Latif is a 54 y.o. female who presents for Follow-up.    HPI   Needs a refill on meds.   Noticed a lump in her l breast when cat stepped on her. Noticed a few weeks away. Last mammo 3/2023.  She is having some stress with girlfriend and son.   Meds are working and no more tachy.  Review of Systems   All other systems reviewed and are negative.      Objective   /88 (BP Location: Right arm, Patient Position: Sitting, BP Cuff Size: Adult)   Ht 1.676 m (5' 6\")   Wt 92.1 kg (203 lb)   BMI 32.77 kg/m²     Physical Exam  Constitutional:       Appearance: Normal appearance.   HENT:      Head: Normocephalic.      Right Ear: Tympanic membrane normal.      Nose: Nose normal.      Mouth/Throat:      Mouth: Mucous membranes are moist.   Eyes:      Pupils: Pupils are equal, round, and reactive to light.   Cardiovascular:      Rate and Rhythm: Normal rate and regular rhythm.      Pulses: Normal pulses.   Pulmonary:      Effort: Pulmonary effort is normal.   Chest:          Comments: Breast lumps felt  Abdominal:      General: Abdomen is flat.   Musculoskeletal:         General: Normal range of motion.      Cervical back: Normal range of motion.   Skin:     General: Skin is warm.   Neurological:      Mental Status: She is alert.   Psychiatric:         Mood and Affect: Mood normal.         Assessment/Plan   Diagnoses and all orders for this visit:  Paroxysmal supraventricular tachycardia (CMS-HCC)   resolved  Memory deficit  -     ARIPiprazole (Abilify) 10 mg tablet; Take 1 tablet (10 mg) by mouth once daily.  -     escitalopram (Lexapro) 20 mg tablet; Take 1 tablet (20 mg) by mouth once daily.  -     memantine (Namenda) 10 mg tablet; Take 1 tablet (10 mg) by mouth 2 times a day.  -     metoprolol succinate XL (Toprol-XL) 50 mg 24 hr tablet; Take 1 tablet (50 mg) by mouth once daily.  Bipolar 2 disorder (Multi)  -     ARIPiprazole (Abilify) 10 mg tablet; Take 1 tablet (10 mg) by mouth once " daily.  -     escitalopram (Lexapro) 20 mg tablet; Take 1 tablet (20 mg) by mouth once daily.  Hypertension, unspecified type  -     metoprolol succinate XL (Toprol-XL) 50 mg 24 hr tablet; Take 1 tablet (50 mg) by mouth once daily.  Abnormal mammogram of both breasts  -     BI mammo bilateral diagnostic tomosynthesis; Future

## 2025-02-02 PROCEDURE — RXMED WILLOW AMBULATORY MEDICATION CHARGE

## 2025-02-07 ENCOUNTER — PHARMACY VISIT (OUTPATIENT)
Dept: PHARMACY | Facility: CLINIC | Age: 55
End: 2025-02-07
Payer: COMMERCIAL

## 2025-02-07 ENCOUNTER — SPECIALTY PHARMACY (OUTPATIENT)
Dept: PHARMACY | Facility: CLINIC | Age: 55
End: 2025-02-07

## 2025-02-18 ENCOUNTER — APPOINTMENT (OUTPATIENT)
Dept: DERMATOLOGY | Facility: CLINIC | Age: 55
End: 2025-02-18
Payer: COMMERCIAL

## 2025-02-18 VITALS — HEART RATE: 69 BPM | SYSTOLIC BLOOD PRESSURE: 129 MMHG | DIASTOLIC BLOOD PRESSURE: 88 MMHG

## 2025-02-18 DIAGNOSIS — C44.311 BASAL CELL CARCINOMA (BCC) OF SKIN OF NOSE: ICD-10-CM

## 2025-02-18 PROCEDURE — 17311 MOHS 1 STAGE H/N/HF/G: CPT | Performed by: STUDENT IN AN ORGANIZED HEALTH CARE EDUCATION/TRAINING PROGRAM

## 2025-02-18 NOTE — PROGRESS NOTES
Mohs Surgery Operative Note    Date of Surgery:  2/18/2025  Surgeon:  Bk Simms MD  Office Location:  [address] [facility]  [address] [address]  Dept: [phone]  Dept Fax: [phone]  Referring Provider: Susan L Mayne, LUCRECIA-MAG  [address] [address]      Assessment/Plan   Pre-procedure:   Obtained informed consent: written from patient  The surgical site was identified and confirmed with the patient.     Intra-operative:   Audible time out called at : 8:31 AM 02/18/25  by: Valentina Gonzalez RN   Verified patient name, birthdate, site, specimen bottle label & requisition.    The planned procedure(s) was again reviewed with the patient. The risks of bleeding, infection, nerve damage and scarring were reviewed. Written authorization was obtained. The patient identity, surgical site, and planned procedure(s) were verified. The provider acted as both surgeon and pathologist.     Basal cell carcinoma (BCC) of skin of nose  Left Ala Nasi    Mohs surgery    Consent obtained: written    Universal Protocol:  Procedure explained and questions answered to patient or proxy's satisfaction: Yes    Test results available and properly labeled: Yes    Pathology report reviewed: Yes    External notes reviewed: Yes    Photo or diagram used for site identification: Yes    Site/side marked: Yes    Slide independently reviewed by Mohs surgeon: Yes    Immediately prior to procedure a time out was called: Yes    Patient identity confirmed: verbally with patient  Preparation: Patient was prepped and draped in usual sterile fashion      Anticoagulation:  Is the patient taking prescription anticoagulant and/or aspirin prescribed/recommended by a physician? No    Was the anticoagulation regimen changed prior to Mohs? No      Anesthesia:  Anesthesia method: local infiltration  Local anesthetic: lidocaine 1% WITH epi    Procedure Details:  Case ID Number:  [id number]  Biopsy accession number: [id number]  Date of biopsy: 1/8/2025  Pre-Op diagnosis: basal cell carcinoma  BCC subtype: nodular  Surgical site (from skin exam): Left Ala Nasi  Pre-operative length (cm): 0.6  Pre-operative width (cm): 0.6  Indications for Mohs surgery: anatomic location where tissue conservation is critical  Previously treated? No      Micrographic Surgery Details:  Post-operative length (cm): 0.6  Post-operative width (cm): 0.6  Number of Mohs stages: 1    Stage 1     Comments: The patient was brought into the operating room and placed in the procedure chair in the appropriate position.  The area positive by previous biopsy was identified and confirmed with the patient. The area of clinically obvious tumor was debulked using a curette and/or scalpel as needed. An incision was made following the Mohs approach through the skin. The specimen was taken to the lab, divided into 2 piece(s) and appropriately chromacoded and processed.         Tumor features identified on Mohs section: no tumor identified    Depth of defect: subcutaneous fat    Patient tolerance of procedure: tolerated well, no immediate complications    Reconstruction:  Was the defect reconstructed?: No     Repair: After a discussion with the patient regarding the options for wound closure, a decision was made to proceed with second intention healing.    Dressing/Follow-up: Surgifoam was placed in the wound. A pressure dressing was placed to help stabilize the wound and to minimize the risk of postoperative bleeding. Wound care was discussed, and the patient was given written post-operative wound care instructions.    Outcome: patient tolerated procedure well with no complications    Post-procedure details: sterile dressing applied and wound care instructions given    Dressing type: pressure dressing and Gelfoam      Staff Communication: Dermatology Local Anesthesia: 1 % Lidocaine / Epinephrine - Amount: 1.7ml            Wound care was  discussed, and the patient was given written post-operative wound care instructions.      The patient will follow up with Bk Simms MD as needed for any post operative problems or concerns, and will follow up with their primary dermatologist as scheduled.

## 2025-02-18 NOTE — PROGRESS NOTES
Office Visit Note  Date: 2/18/2025  Surgeon:  Bk Simms MD  Office Location:  [id number] [address]  Dept: [phone]  Dept Fax: [phone]  Referring Provider: Susan L Mayne, LUCRECIA-MAG  [address] [address]    Subjective   Debora Latif is a 54 y.o. female who presents for the following: MOHS Surgery    According to the patient, the lesion has been present for approximately greater than 1 year at the time of diagnosis.  The lesion is not causing symptoms.  The lesion has not been treated previously.    The patient does not have a pacemaker / defibrillator.  The patient does not have a heart valve / joint replacement.    The patient is not on blood thinners.  The patient does not have a history of hepatitis B or C.  The patient does not have a history of HIV.  The patient does have a history of immunosuppression (e.g. organ transplantation, malignancy, medications)    Review of Systems:  No other skin or systemic complaints other than what is documented elsewhere in the note.    MEDICAL HISTORY: clinically relevant history including significant past medical history, medications and allergies was reviewed and documented in Epic.    Objective   Well appearing patient in no apparent distress; mood and affect are within normal limits.  Vital signs: See record.  Noted on the Left Ala Nasi  Is a 0.6 x 0.6 cm scar        The patient confirmed the identified site using a mirror.     Discussion:  The nature of the diagnosis was explained. The lesion is a skin cancer.  It has a risk of local growth and distant spread. The condition is associated with sun exposure.  Warning signs of non-melanoma skin cancer discussed. Patient was instructed to perform monthly self skin examination.  We recommended that the patient have regular full skin exams given an increased risk of subsequent skin cancers. The patient was instructed to use sun  protective behaviors including use of broad spectrum sunscreens and sun protective clothing to reduce risk of skin cancers.      Risks, benefits, side effects of Mohs surgery were discussed with patient and the patient voiced understanding.  It was explained that even though the cure rate of Mohs is very high it is not 100%. Risks of surgery including but not limited to bleeding, infection, numbness, nerve damage, and scar were reviewed.  Discussion included wound care requirements, activity restrictions, likely scar outcome and time to heal.     After Mohs surgery, the defect may need to be repaired surgically and the scar may be longer than the original lesion.  Reconstruction options, risks, and benefits were reviewed including second intention healing, linear repair (4-1 ratio was explained), local flaps, skin grafts, cartilage grafts and interpolation flaps (the need for multiple surgeries was explained). Possible outcomes were reviewed including likely scar appearance, failure of flap survival, infection, bleeding and the need for revision surgery.     The pathology was reviewed, the photograph was reviewed, and the referring physician's note was reviewed.    Patient elected for Mohs surgery.

## 2025-02-20 ENCOUNTER — HOSPITAL ENCOUNTER (OUTPATIENT)
Dept: RADIOLOGY | Facility: CLINIC | Age: 55
Discharge: HOME | End: 2025-02-20
Payer: COMMERCIAL

## 2025-02-20 VITALS — BODY MASS INDEX: 32.62 KG/M2 | HEIGHT: 66 IN | WEIGHT: 203 LBS

## 2025-02-20 DIAGNOSIS — N63.10 BILATERAL BREAST LUMP: ICD-10-CM

## 2025-02-20 DIAGNOSIS — R92.8 ABNORMAL MAMMOGRAM OF BOTH BREASTS: ICD-10-CM

## 2025-02-20 DIAGNOSIS — N63.20 BILATERAL BREAST LUMP: ICD-10-CM

## 2025-02-20 PROCEDURE — 77062 BREAST TOMOSYNTHESIS BI: CPT

## 2025-02-20 PROCEDURE — 76642 ULTRASOUND BREAST LIMITED: CPT | Mod: LT

## 2025-03-05 ENCOUNTER — SPECIALTY PHARMACY (OUTPATIENT)
Dept: PHARMACY | Facility: CLINIC | Age: 55
End: 2025-03-05

## 2025-03-05 ENCOUNTER — PHARMACY VISIT (OUTPATIENT)
Dept: PHARMACY | Facility: CLINIC | Age: 55
End: 2025-03-05
Payer: COMMERCIAL

## 2025-03-05 PROCEDURE — RXMED WILLOW AMBULATORY MEDICATION CHARGE

## 2025-03-28 ENCOUNTER — PHARMACY VISIT (OUTPATIENT)
Dept: PHARMACY | Facility: CLINIC | Age: 55
End: 2025-03-28
Payer: COMMERCIAL

## 2025-03-28 PROCEDURE — RXMED WILLOW AMBULATORY MEDICATION CHARGE

## 2025-03-28 ASSESSMENT — PROMIS GLOBAL HEALTH SCALE
RATE_GENERAL_HEALTH: GOOD
RATE_SOCIAL_SATISFACTION: FAIR
RATE_PHYSICAL_HEALTH: GOOD
RATE_AVERAGE_FATIGUE: MODERATE
CARRYOUT_PHYSICAL_ACTIVITIES: MODERATELY
RATE_QUALITY_OF_LIFE: GOOD
CARRYOUT_SOCIAL_ACTIVITIES: GOOD
RATE_AVERAGE_PAIN: 0
EMOTIONAL_PROBLEMS: SOMETIMES
RATE_MENTAL_HEALTH: GOOD

## 2025-03-31 ENCOUNTER — SPECIALTY PHARMACY (OUTPATIENT)
Dept: PHARMACY | Facility: CLINIC | Age: 55
End: 2025-03-31

## 2025-04-01 ENCOUNTER — APPOINTMENT (OUTPATIENT)
Dept: PRIMARY CARE | Facility: CLINIC | Age: 55
End: 2025-04-01
Payer: COMMERCIAL

## 2025-04-22 ASSESSMENT — PROMIS GLOBAL HEALTH SCALE
RATE_AVERAGE_FATIGUE: MODERATE
CARRYOUT_PHYSICAL_ACTIVITIES: MODERATELY
CARRYOUT_SOCIAL_ACTIVITIES: GOOD
RATE_MENTAL_HEALTH: GOOD
RATE_AVERAGE_PAIN: 0
RATE_SOCIAL_SATISFACTION: FAIR
RATE_PHYSICAL_HEALTH: GOOD
RATE_QUALITY_OF_LIFE: GOOD
RATE_GENERAL_HEALTH: GOOD
EMOTIONAL_PROBLEMS: SOMETIMES

## 2025-04-28 ENCOUNTER — SPECIALTY PHARMACY (OUTPATIENT)
Dept: PHARMACY | Facility: CLINIC | Age: 55
End: 2025-04-28

## 2025-04-28 ENCOUNTER — PHARMACY VISIT (OUTPATIENT)
Dept: PHARMACY | Facility: CLINIC | Age: 55
End: 2025-04-28
Payer: COMMERCIAL

## 2025-04-28 PROCEDURE — RXMED WILLOW AMBULATORY MEDICATION CHARGE

## 2025-04-29 ENCOUNTER — APPOINTMENT (OUTPATIENT)
Dept: PRIMARY CARE | Facility: CLINIC | Age: 55
End: 2025-04-29
Payer: COMMERCIAL

## 2025-04-29 VITALS
WEIGHT: 202 LBS | DIASTOLIC BLOOD PRESSURE: 80 MMHG | HEIGHT: 66 IN | OXYGEN SATURATION: 93 % | BODY MASS INDEX: 32.47 KG/M2 | HEART RATE: 76 BPM | SYSTOLIC BLOOD PRESSURE: 128 MMHG

## 2025-04-29 DIAGNOSIS — R73.9 HYPERGLYCEMIA: ICD-10-CM

## 2025-04-29 DIAGNOSIS — R32 URINARY INCONTINENCE, UNSPECIFIED TYPE: ICD-10-CM

## 2025-04-29 DIAGNOSIS — I10 HYPERTENSION, UNSPECIFIED TYPE: ICD-10-CM

## 2025-04-29 DIAGNOSIS — R41.3 MEMORY DEFICIT: ICD-10-CM

## 2025-04-29 DIAGNOSIS — F31.81 BIPOLAR 2 DISORDER (MULTI): ICD-10-CM

## 2025-04-29 DIAGNOSIS — R06.00 DYSPNEA, UNSPECIFIED TYPE: ICD-10-CM

## 2025-04-29 DIAGNOSIS — Z00.00 WELL ADULT EXAM: Primary | ICD-10-CM

## 2025-04-29 PROBLEM — R01.1 HEART MURMUR: Status: ACTIVE | Noted: 2025-04-29

## 2025-04-29 PROBLEM — L81.4 OTHER MELANIN HYPERPIGMENTATION: Status: RESOLVED | Noted: 2022-01-18 | Resolved: 2025-04-29

## 2025-04-29 PROBLEM — M75.82 TENDINITIS OF LEFT ROTATOR CUFF: Status: RESOLVED | Noted: 2025-04-29 | Resolved: 2025-04-29

## 2025-04-29 PROBLEM — R00.2 PALPITATIONS: Status: RESOLVED | Noted: 2023-03-22 | Resolved: 2025-04-29

## 2025-04-29 PROBLEM — M25.512 LEFT SHOULDER PAIN: Status: RESOLVED | Noted: 2025-04-29 | Resolved: 2025-04-29

## 2025-04-29 PROCEDURE — 3079F DIAST BP 80-89 MM HG: CPT | Performed by: FAMILY MEDICINE

## 2025-04-29 PROCEDURE — 1036F TOBACCO NON-USER: CPT | Performed by: FAMILY MEDICINE

## 2025-04-29 PROCEDURE — 99214 OFFICE O/P EST MOD 30 MIN: CPT | Performed by: FAMILY MEDICINE

## 2025-04-29 PROCEDURE — 3074F SYST BP LT 130 MM HG: CPT | Performed by: FAMILY MEDICINE

## 2025-04-29 PROCEDURE — 3008F BODY MASS INDEX DOCD: CPT | Performed by: FAMILY MEDICINE

## 2025-04-29 PROCEDURE — 99396 PREV VISIT EST AGE 40-64: CPT | Performed by: FAMILY MEDICINE

## 2025-04-29 RX ORDER — ESCITALOPRAM OXALATE 20 MG/1
20 TABLET ORAL DAILY
Qty: 90 TABLET | Refills: 0 | Status: SHIPPED | OUTPATIENT
Start: 2025-04-29

## 2025-04-29 RX ORDER — OXYBUTYNIN CHLORIDE 5 MG/1
5 TABLET, EXTENDED RELEASE ORAL DAILY
Qty: 30 TABLET | Refills: 11 | Status: SHIPPED | OUTPATIENT
Start: 2025-04-29 | End: 2026-04-29

## 2025-04-29 RX ORDER — ALBUTEROL SULFATE 90 UG/1
2 INHALANT RESPIRATORY (INHALATION) EVERY 4 HOURS PRN
Qty: 8.5 G | Refills: 0 | Status: SHIPPED | OUTPATIENT
Start: 2025-04-29 | End: 2026-04-29

## 2025-04-29 RX ORDER — ARIPIPRAZOLE 10 MG/1
10 TABLET ORAL DAILY
Qty: 90 TABLET | Refills: 0 | Status: SHIPPED | OUTPATIENT
Start: 2025-04-29

## 2025-04-29 RX ORDER — METOPROLOL SUCCINATE 50 MG/1
50 TABLET, EXTENDED RELEASE ORAL DAILY
Qty: 90 TABLET | Refills: 0 | Status: SHIPPED | OUTPATIENT
Start: 2025-04-29

## 2025-04-29 NOTE — PROGRESS NOTES
Martinez Latif is a 54 y.o. female and is here for a comprehensive physical exam. The patient reports problems -   .  She is dribbling with movement. 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 Tried ditropan and it worked well.  She is coughing a lot and she is using primatene mist. If she is doing yard work she has a hard time breathing like an asthma attack.  Do you take any herbs or supplements that were not prescribed by a doctor? no  Are you taking calcium supplements? no  Are you taking aspirin daily? no      History:  LMP: No LMP recorded. Patient has had a hysterectomy.  Menopause at  years  Last pap date: within limits  Abnormal pap? no  : 5  Para: 3    Do you have pain that bothers you in your daily life? no  Review of Systems   All other systems reviewed and are negative.       Objective   Physical Exam  Constitutional:       Appearance: Normal appearance.   HENT:      Head: Normocephalic.      Right Ear: Tympanic membrane normal.      Nose: Nose normal.      Mouth/Throat:      Mouth: Mucous membranes are moist.   Eyes:      Pupils: Pupils are equal, round, and reactive to light.   Cardiovascular:      Rate and Rhythm: Normal rate and regular rhythm.      Pulses: Normal pulses.   Pulmonary:      Effort: Pulmonary effort is normal.   Abdominal:      General: Abdomen is flat.   Musculoskeletal:         General: Normal range of motion.      Cervical back: Normal range of motion.   Skin:     General: Skin is warm.   Neurological:      Mental Status: She is alert.   Psychiatric:         Mood and Affect: Mood normal.         Assessment/Plan   Healthy female exam.      1.   Problem List Items Addressed This Visit       Bipolar 2 disorder (Multi)    Relevant Medications    ARIPiprazole (Abilify) 10 mg tablet    escitalopram (Lexapro) 20 mg tablet    Memory deficit    Relevant Medications    ARIPiprazole (Abilify) 10 mg tablet    escitalopram (Lexapro) 20 mg tablet    metoprolol  succinate XL (Toprol-XL) 50 mg 24 hr tablet    Hypertension    Relevant Medications    metoprolol succinate XL (Toprol-XL) 50 mg 24 hr tablet    oxybutynin XL (Ditropan XL) 5 mg 24 hr tablet    albuterol (ProAir HFA) 90 mcg/actuation inhaler    Other Relevant Orders    Complete Pulmonary Function Test Pre/Post Bronchodilator (Spirometry Pre/Post/DLCO/Lung Volumes)    Urinalysis with Reflex Microscopic    Lipid Panel    CBC and Auto Differential    Thyroid Stimulating Hormone    Comprehensive Metabolic Panel    Hemoglobin A1c     Other Visit Diagnoses         Well adult exam    -  Primary    Relevant Medications    oxybutynin XL (Ditropan XL) 5 mg 24 hr tablet    albuterol (ProAir HFA) 90 mcg/actuation inhaler    Other Relevant Orders    Complete Pulmonary Function Test Pre/Post Bronchodilator (Spirometry Pre/Post/DLCO/Lung Volumes)    Urinalysis with Reflex Microscopic    Lipid Panel    CBC and Auto Differential    Thyroid Stimulating Hormone    Comprehensive Metabolic Panel    Hemoglobin A1c    Colonoscopy Screening; Average Risk Patient      Dyspnea, unspecified type        Relevant Medications    albuterol (ProAir HFA) 90 mcg/actuation inhaler    Other Relevant Orders    Complete Pulmonary Function Test Pre/Post Bronchodilator (Spirometry Pre/Post/DLCO/Lung Volumes)      Urinary incontinence, unspecified type        Relevant Medications    oxybutynin XL (Ditropan XL) 5 mg 24 hr tablet      Hyperglycemia        Relevant Orders    Hemoglobin A1c           2. Patient Counseling:  --Nutrition: Stressed importance of moderation in sodium/caffeine intake, saturated fat and cholesterol, caloric balance, sufficient intake of fresh fruits, vegetables, fiber, calcium    --Exercise: Stressed the importance of regular exercise.        --Injury prevention: Discussed safety belts, safety helmets, smoke detector, smoking near bedding or upholstery.   --Dental health: Discussed importance of regular tooth brushing, flossing, and  dental visits.  --Immunizations reviewed.  --Discussed benefits of screening colonoscopy.  --After hours service discussed with patient  3. Discussed the patient's BMI with her.  The BMI 32  4. Follow up in 3 months

## 2025-04-30 ENCOUNTER — TELEPHONE (OUTPATIENT)
Facility: CLINIC | Age: 55
End: 2025-04-30
Payer: COMMERCIAL

## 2025-05-01 LAB
ALBUMIN SERPL-MCNC: 4.2 G/DL (ref 3.6–5.1)
ALP SERPL-CCNC: 88 U/L (ref 37–153)
ALT SERPL-CCNC: 18 U/L (ref 6–29)
ANION GAP SERPL CALCULATED.4IONS-SCNC: 7 MMOL/L (CALC) (ref 7–17)
APPEARANCE UR: CLEAR
AST SERPL-CCNC: 18 U/L (ref 10–35)
BASOPHILS # BLD AUTO: 30 CELLS/UL (ref 0–200)
BASOPHILS NFR BLD AUTO: 0.6 %
BILIRUB SERPL-MCNC: 0.4 MG/DL (ref 0.2–1.2)
BILIRUB UR QL STRIP: NEGATIVE
BUN SERPL-MCNC: 11 MG/DL (ref 7–25)
CALCIUM SERPL-MCNC: 9.1 MG/DL (ref 8.6–10.4)
CHLORIDE SERPL-SCNC: 103 MMOL/L (ref 98–110)
CHOLEST SERPL-MCNC: 199 MG/DL
CHOLEST/HDLC SERPL: 3.9 (CALC)
CO2 SERPL-SCNC: 29 MMOL/L (ref 20–32)
COLOR UR: YELLOW
CREAT SERPL-MCNC: 0.75 MG/DL (ref 0.5–1.03)
EGFRCR SERPLBLD CKD-EPI 2021: 93 ML/MIN/1.73M2
EOSINOPHIL # BLD AUTO: 130 CELLS/UL (ref 15–500)
EOSINOPHIL NFR BLD AUTO: 2.6 %
ERYTHROCYTE [DISTWIDTH] IN BLOOD BY AUTOMATED COUNT: 12.5 % (ref 11–15)
EST. AVERAGE GLUCOSE BLD GHB EST-MCNC: 120 MG/DL
EST. AVERAGE GLUCOSE BLD GHB EST-SCNC: 6.6 MMOL/L
GLUCOSE SERPL-MCNC: 93 MG/DL (ref 65–99)
GLUCOSE UR QL STRIP: NEGATIVE
HBA1C MFR BLD: 5.8 %
HCT VFR BLD AUTO: 44 % (ref 35–45)
HDLC SERPL-MCNC: 51 MG/DL
HGB BLD-MCNC: 14.6 G/DL (ref 11.7–15.5)
HGB UR QL STRIP: NEGATIVE
KETONES UR QL STRIP: NEGATIVE
LDLC SERPL CALC-MCNC: 123 MG/DL (CALC)
LEUKOCYTE ESTERASE UR QL STRIP: NEGATIVE
LYMPHOCYTES # BLD AUTO: 1820 CELLS/UL (ref 850–3900)
LYMPHOCYTES NFR BLD AUTO: 36.4 %
MCH RBC QN AUTO: 30.5 PG (ref 27–33)
MCHC RBC AUTO-ENTMCNC: 33.2 G/DL (ref 32–36)
MCV RBC AUTO: 91.9 FL (ref 80–100)
MONOCYTES # BLD AUTO: 430 CELLS/UL (ref 200–950)
MONOCYTES NFR BLD AUTO: 8.6 %
NEUTROPHILS # BLD AUTO: 2590 CELLS/UL (ref 1500–7800)
NEUTROPHILS NFR BLD AUTO: 51.8 %
NITRITE UR QL STRIP: NEGATIVE
NONHDLC SERPL-MCNC: 148 MG/DL (CALC)
PH UR STRIP: 7.5 [PH] (ref 5–8)
PLATELET # BLD AUTO: 254 THOUSAND/UL (ref 140–400)
PMV BLD REES-ECKER: 10.2 FL (ref 7.5–12.5)
POTASSIUM SERPL-SCNC: 4.3 MMOL/L (ref 3.5–5.3)
PROT SERPL-MCNC: 6.7 G/DL (ref 6.1–8.1)
PROT UR QL STRIP: NEGATIVE
RBC # BLD AUTO: 4.79 MILLION/UL (ref 3.8–5.1)
SODIUM SERPL-SCNC: 139 MMOL/L (ref 135–146)
SP GR UR STRIP: 1.01 (ref 1–1.03)
TRIGL SERPL-MCNC: 140 MG/DL
TSH SERPL-ACNC: 1.71 MIU/L (ref 0.4–4.5)
WBC # BLD AUTO: 5 THOUSAND/UL (ref 3.8–10.8)

## 2025-05-05 NOTE — TELEPHONE ENCOUNTER
[facility] OPEN ACCESS COLONOSCOPY SCREENING FORM       Last Colonoscopy? never  ANESTHESIA SCREENING   1. Height 5'6     Weight 200  BMI 32.2    (Clinic Visit if BMI over 42)   2. Are you on any blood thinning medications including  mg? no  ( Clinic visit if yes)  3. Are you on oxygen at home? no (Clinic visit if yes)   4. Have you seen your primary care provider within the last 12 months? 5.1.2025 (Clinic visit if NO)   5. History of:   Pacemaker/Defibrillator no                          Heart Failure no                         Heart Disease no                          Stroke no   Details of cardiac history: HTN  (Clinic visit if history of heart failure or new diagnosis/new intervention in last year)     6. Do you have renal failure or require dialysis? no  7. Do you have sleep Apnea? no  8. Are you on insulin for diabetes? no If yes, patient should discuss nolan-procedural medication adjustment with PCP.   9. Are you currently on GLP-1 or SGLT2 inhibitors? no  10. Do you have a history of seizures? no (If YES- indicate recent or NOT recent. Anesthesia to review if recent.)  11.) Do you have a history of Drug/Alcohol Abuse? Marijuana occassional  (If YES- indicate how recent. If within the last year Patient needs to be seen in the office)    GI SCREENING   Have you had a positive stool based screening test? (i.e. fecal occult, FIT, or Cologuard) no   ? If yes and pass anesthesia screen, no further questions are needed. Schedule OA procedure.   ? If yes and they do NOT pass anesthesia screen then refer to office for expedited review/visit.   ? If no, proceed to the following GI screening questions to determine if office visit is needed.      If patient answers YES to any of the following please send to the office for an appointment.  1. Do you have chronic diarrhea lasting longer than 3 weeks? no   2. Do you have a large amount of rectal bleeding or frequent rectal bleeding? no  3. Do you have significant,  unexplained weight loss? no      Open Access APPROVED yes    Patient is scheduled for 5.14.2025 with Dr. Macias. Packet with Miralax prep sent via [employer]

## 2025-05-14 ENCOUNTER — HOSPITAL ENCOUNTER (OUTPATIENT)
Dept: GASTROENTEROLOGY | Facility: HOSPITAL | Age: 55
Discharge: HOME | End: 2025-05-14
Payer: COMMERCIAL

## 2025-05-14 ENCOUNTER — ANESTHESIA EVENT (OUTPATIENT)
Dept: GASTROENTEROLOGY | Facility: HOSPITAL | Age: 55
End: 2025-05-14
Payer: COMMERCIAL

## 2025-05-14 ENCOUNTER — ANESTHESIA (OUTPATIENT)
Dept: GASTROENTEROLOGY | Facility: HOSPITAL | Age: 55
End: 2025-05-14
Payer: COMMERCIAL

## 2025-05-14 VITALS
RESPIRATION RATE: 14 BRPM | WEIGHT: 202 LBS | DIASTOLIC BLOOD PRESSURE: 87 MMHG | HEIGHT: 66 IN | HEART RATE: 58 BPM | BODY MASS INDEX: 32.47 KG/M2 | OXYGEN SATURATION: 94 % | TEMPERATURE: 97.3 F | SYSTOLIC BLOOD PRESSURE: 123 MMHG

## 2025-05-14 DIAGNOSIS — Z00.00 WELL ADULT EXAM: ICD-10-CM

## 2025-05-14 PROBLEM — J45.909 MILD ASTHMA (HHS-HCC): Status: ACTIVE | Noted: 2025-05-14

## 2025-05-14 PROCEDURE — 2500000004 HC RX 250 GENERAL PHARMACY W/ HCPCS (ALT 636 FOR OP/ED): Mod: JW | Performed by: NURSE ANESTHETIST, CERTIFIED REGISTERED

## 2025-05-14 PROCEDURE — 7100000009 HC PHASE TWO TIME - INITIAL BASE CHARGE

## 2025-05-14 PROCEDURE — 7100000010 HC PHASE TWO TIME - EACH INCREMENTAL 1 MINUTE

## 2025-05-14 PROCEDURE — 3700000001 HC GENERAL ANESTHESIA TIME - INITIAL BASE CHARGE

## 2025-05-14 PROCEDURE — 2500000004 HC RX 250 GENERAL PHARMACY W/ HCPCS (ALT 636 FOR OP/ED): Mod: JZ | Performed by: INTERNAL MEDICINE

## 2025-05-14 PROCEDURE — 3700000002 HC GENERAL ANESTHESIA TIME - EACH INCREMENTAL 1 MINUTE

## 2025-05-14 PROCEDURE — 45380 COLONOSCOPY AND BIOPSY: CPT | Performed by: INTERNAL MEDICINE

## 2025-05-14 RX ORDER — PROPOFOL 10 MG/ML
INJECTION, EMULSION INTRAVENOUS AS NEEDED
Status: DISCONTINUED | OUTPATIENT
Start: 2025-05-14 | End: 2025-05-14

## 2025-05-14 RX ORDER — LIDOCAINE HYDROCHLORIDE 20 MG/ML
INJECTION, SOLUTION INFILTRATION; PERINEURAL AS NEEDED
Status: DISCONTINUED | OUTPATIENT
Start: 2025-05-14 | End: 2025-05-14

## 2025-05-14 RX ORDER — SODIUM CHLORIDE 9 MG/ML
20 INJECTION, SOLUTION INTRAVENOUS CONTINUOUS
Status: ACTIVE | OUTPATIENT
Start: 2025-05-14 | End: 2025-05-14

## 2025-05-14 RX ADMIN — SODIUM CHLORIDE 20 ML/HR: 0.9 INJECTION, SOLUTION INTRAVENOUS at 13:26

## 2025-05-14 RX ADMIN — PROPOFOL 50 MG: 10 INJECTION, EMULSION INTRAVENOUS at 13:41

## 2025-05-14 RX ADMIN — PROPOFOL 50 MG: 10 INJECTION, EMULSION INTRAVENOUS at 13:33

## 2025-05-14 RX ADMIN — LIDOCAINE HYDROCHLORIDE 2 ML: 20 INJECTION, SOLUTION INFILTRATION; PERINEURAL at 13:30

## 2025-05-14 RX ADMIN — PROPOFOL 50 MG: 10 INJECTION, EMULSION INTRAVENOUS at 13:32

## 2025-05-14 RX ADMIN — PROPOFOL 50 MG: 10 INJECTION, EMULSION INTRAVENOUS at 13:37

## 2025-05-14 RX ADMIN — PROPOFOL 100 MG: 10 INJECTION, EMULSION INTRAVENOUS at 13:30

## 2025-05-14 SDOH — HEALTH STABILITY: MENTAL HEALTH: CURRENT SMOKER: 1

## 2025-05-14 ASSESSMENT — PAIN SCALES - GENERAL
PAINLEVEL_OUTOF10: 0 - NO PAIN
PAIN_LEVEL: 0
PAINLEVEL_OUTOF10: 0 - NO PAIN

## 2025-05-14 ASSESSMENT — PAIN - FUNCTIONAL ASSESSMENT
PAIN_FUNCTIONAL_ASSESSMENT: 0-10

## 2025-05-14 ASSESSMENT — COLUMBIA-SUICIDE SEVERITY RATING SCALE - C-SSRS
6. HAVE YOU EVER DONE ANYTHING, STARTED TO DO ANYTHING, OR PREPARED TO DO ANYTHING TO END YOUR LIFE?: NO
1. IN THE PAST MONTH, HAVE YOU WISHED YOU WERE DEAD OR WISHED YOU COULD GO TO SLEEP AND NOT WAKE UP?: NO
2. HAVE YOU ACTUALLY HAD ANY THOUGHTS OF KILLING YOURSELF?: NO

## 2025-05-14 NOTE — H&P
History Of Present Illness  Debora Latif is a 54 y.o. female presenting screening colonoscopy.     Past Medical History  Medical History[1]    Surgical History  Surgical History[2]     Social History  She reports that she quit smoking about 3 years ago. Her smoking use included cigarettes. She has never been exposed to tobacco smoke. She has never used smokeless tobacco. She reports current alcohol use. She reports current drug use. Frequency: 7.00 times per week. Drug: Marijuana.    Family History  Family History[3]     Allergies  Codeine and Wellbutrin [bupropion hcl]    Review of Systems     Physical Exam  Constitutional:       General: She is awake.      Appearance: Normal appearance.   HENT:      Head: Normocephalic and atraumatic.      Nose: Nose normal.      Mouth/Throat:      Mouth: Mucous membranes are moist.   Eyes:      Pupils: Pupils are equal, round, and reactive to light.   Neck:      Thyroid: No thyroid mass.      Trachea: Phonation normal.   Cardiovascular:      Rate and Rhythm: Normal rate and regular rhythm.   Pulmonary:      Effort: Pulmonary effort is normal. No respiratory distress.      Breath sounds: Normal air entry. No decreased breath sounds, wheezing, rhonchi or rales.   Abdominal:      General: Bowel sounds are normal. There is no distension.      Palpations: Abdomen is soft.      Tenderness: There is no abdominal tenderness.   Musculoskeletal:      Cervical back: Neck supple.      Right lower leg: No edema.      Left lower leg: No edema.   Skin:     General: Skin is warm.      Capillary Refill: Capillary refill takes less than 2 seconds.   Neurological:      General: No focal deficit present.      Mental Status: She is alert and oriented to person, place, and time. Mental status is at baseline.      Cranial Nerves: Cranial nerves 2-12 are intact.      Motor: Motor function is intact.   Psychiatric:         Attention and Perception: Attention and perception normal.         Mood and  Affect: Mood normal.         Speech: Speech normal.         Behavior: Behavior normal.          Last Recorded Vitals  There were no vitals taken for this visit.    Relevant Results        Current Outpatient Medications   Medication Instructions    albuterol (ProAir HFA) 90 mcg/actuation inhaler 2 puffs, inhalation, Every 4 hours PRN    ARIPiprazole (ABILIFY) 10 mg, oral, Daily    dupilumab (Dupixent) 300 mg/2 mL pen injector Inject 1 pen (300 mg) under the skin every 14 (fourteen) days.    escitalopram (LEXAPRO) 20 mg, oral, Daily    metoprolol succinate XL (TOPROL-XL) 50 mg, oral, Daily    oxyBUTYnin XL (DITROPAN XL) 5 mg, oral, Daily, Do not crush, chew, or split.          Assessment & Plan  Well adult exam      Average Risk Colorectal Cancer   - screening colonoscopy for evaluation       Risk and benefits of the endoscopic procedure including bleeding perforation and infection were discussed with patient and they wish to proceed          Alli Macias DO         [1]   Past Medical History:  Diagnosis Date    Left shoulder pain 04/29/2025    Other melanin hyperpigmentation 01/18/2022    Palpitations 03/22/2023    Personal history of other mental and behavioral disorders     History of anxiety    Tendinitis of left rotator cuff 04/29/2025   [2]   Past Surgical History:  Procedure Laterality Date    OTHER SURGICAL HISTORY  09/02/2020    Hysterectomy total    OTHER SURGICAL HISTORY  09/02/2020    Knee surgery    OTHER SURGICAL HISTORY  09/02/2020    Cholecystectomy   [3]   Family History  Problem Relation Name Age of Onset    COPD Mother      Other (psylliosis) Mother      Other (ihss) Father      Heart failure Paternal Grandmother      Peripheral vascular disease Paternal Grandmother

## 2025-05-14 NOTE — ANESTHESIA PREPROCEDURE EVALUATION
Patient: Debora Latif    Procedure Information       Anesthesia Start Date/Time: 05/14/25 1324    Scheduled providers: Alli Macias DO    Procedure: COLONOSCOPY    Location: [facility]            Relevant Problems   Anesthesia (within normal limits)      Cardiac   (+) Heart murmur   (+) Hypertension   (+) Paroxysmal supraventricular tachycardia      Pulmonary   (+) Mild asthma (HHS-HCC)      Neuro   (+) Bipolar 2 disorder (Multi)   (+) Depression      GI (within normal limits)      /Renal (within normal limits)      Liver (within normal limits)      Endocrine (within normal limits)      Hematology (within normal limits)      ID   (+) COVID-19      Skin   (+) Basal cell carcinoma (BCC) of skin of nose   (+) Cancer of the skin, basal cell       Clinical information reviewed:   Tobacco  Allergies  Meds   Med Hx  Surg Hx  OB Status  Fam Hx  Soc   Hx        NPO Detail:  NPO/Void Status  Date of Last Liquid: 05/14/25  Time of Last Liquid: 0800  Date of Last Solid: 05/12/25  Time of Last Solid: 2000  Last Intake Type: Clear fluids         Physical Exam    Airway  Mallampati: III  TM distance: >3 FB  Neck ROM: full  Mouth opening: 3 or more finger widths     Cardiovascular - normal exam  Rhythm: regular  Rate: normal     Dental - normal exam     Pulmonary - normal exam   Abdominal - normal exam           Anesthesia Plan    History of general anesthesia?: yes  History of complications of general anesthesia?: no    ASA 2     MAC     The patient is a current smoker.  Patient was previously instructed to abstain from smoking on day of procedure.  Patient did not smoke on day of procedure.    intravenous induction   Anesthetic plan and risks discussed with patient.

## 2025-05-14 NOTE — ANESTHESIA POSTPROCEDURE EVALUATION
Patient: Debora Latif    Procedure Summary       Date: 05/14/25 Room / Location: [facility]    Anesthesia Start: 1324 Anesthesia Stop: 1349    Procedure: COLONOSCOPY Diagnosis: Well adult exam    Scheduled Providers: Alli Macias DO Responsible Provider: ÁNGELA Ruiz    Anesthesia Type: MAC ASA Status: 2            Anesthesia Type: MAC    Vitals Value Taken Time   /81 05/14/25 13:58   Temp 36.2 °C (97.1 °F) 05/14/25 13:48   Pulse 72 05/14/25 13:58   Resp 18 05/14/25 13:58   SpO2 94 % 05/14/25 13:58       Anesthesia Post Evaluation    Patient location during evaluation: bedside  Patient participation: complete - patient participated  Level of consciousness: awake and alert  Pain score: 0  Pain management: adequate  Airway patency: patent  Cardiovascular status: stable  Respiratory status: acceptable  Hydration status: acceptable  Postoperative Nausea and Vomiting: none        There were no known notable events for this encounter.

## 2025-05-23 LAB
LABORATORY COMMENT REPORT: NORMAL
PATH REPORT.FINAL DX SPEC: NORMAL
PATH REPORT.GROSS SPEC: NORMAL
PATH REPORT.RELEVANT HX SPEC: NORMAL
PATH REPORT.TOTAL CANCER: NORMAL

## 2025-05-29 ENCOUNTER — PHARMACY VISIT (OUTPATIENT)
Dept: PHARMACY | Facility: CLINIC | Age: 55
End: 2025-05-29
Payer: COMMERCIAL

## 2025-05-29 PROCEDURE — RXMED WILLOW AMBULATORY MEDICATION CHARGE

## 2025-05-30 ENCOUNTER — SPECIALTY PHARMACY (OUTPATIENT)
Dept: PHARMACY | Facility: CLINIC | Age: 55
End: 2025-05-30

## 2025-05-30 ENCOUNTER — TELEMEDICINE (OUTPATIENT)
Dept: PRIMARY CARE | Facility: CLINIC | Age: 55
End: 2025-05-30
Payer: COMMERCIAL

## 2025-05-30 DIAGNOSIS — F31.81 BIPOLAR 2 DISORDER (MULTI): ICD-10-CM

## 2025-05-30 DIAGNOSIS — F32.A DEPRESSION, UNSPECIFIED DEPRESSION TYPE: Primary | ICD-10-CM

## 2025-05-30 DIAGNOSIS — R41.3 MEMORY DEFICIT: ICD-10-CM

## 2025-05-30 DIAGNOSIS — J45.20 MILD INTERMITTENT ASTHMA WITHOUT COMPLICATION (HHS-HCC): ICD-10-CM

## 2025-05-30 PROCEDURE — 99213 OFFICE O/P EST LOW 20 MIN: CPT | Performed by: FAMILY MEDICINE

## 2025-05-30 RX ORDER — ARIPIPRAZOLE 15 MG/1
15 TABLET ORAL DAILY
Qty: 30 TABLET | Refills: 1 | Status: SHIPPED | OUTPATIENT
Start: 2025-05-30 | End: 2025-07-29

## 2025-05-30 ASSESSMENT — ENCOUNTER SYMPTOMS
VOMITING: 0
AGITATION: 1
AURA: 1
FEVER: 0
VERTIGO: 0
NEUROLOGIC COMPLAINT: 1
BACK PAIN: 0
LIGHT-HEADEDNESS: 0
DIZZINESS: 0
HEADACHES: 0
DIAPHORESIS: 0
FATIGUE: 0
DECREASED CONCENTRATION: 1
PALPITATIONS: 1
BOWEL INCONTINENCE: 0
NECK PAIN: 0
SHORTNESS OF BREATH: 0
NERVOUS/ANXIOUS: 1
ABDOMINAL PAIN: 0
CONFUSION: 1
NAUSEA: 0

## 2025-05-30 NOTE — PROGRESS NOTES
Subjective   Patient ID: Debora Latif is a 54 y.o. female who presents for No chief complaint on file..    Acute Neurological Problem  This is a recurrent problem. The current episode started 1 to 4 weeks ago. The neurological problem developed gradually. The problem has been rapidly worsening since onset. There was no focality noted. Associated symptoms include an aura and bladder incontinence. Pertinent negatives include no auditory change, bowel incontinence or vertigo. Past treatments include position change, sleep and walking. The treatment provided no relief.      Pt recently found out her daughter has stage 5 endometriosis and can't get surgery until sept. It has spread to her lung and she has lost well over 100 pounds. She broke up with her fiance but she is still living in the house because she is on the lease until nov 2025. She has been crying a lot and very emotional. Does not have a counselor. Work has been going well.  Review of Systems   Gastrointestinal:  Negative for bowel incontinence.   Genitourinary:  Positive for bladder incontinence.   Neurological:  Negative for vertigo.   Psychiatric/Behavioral:  Positive for agitation and decreased concentration. The patient is nervous/anxious.        Objective   There were no vitals taken for this visit.    Physical Exam    Assessment/Plan   Diagnoses and all orders for this visit:  Depression, unspecified depression type   Encouraged counseling. Pt to look into [employer]. virtual works better for her will f/up with each other in one month  Memory deficit  -     ARIPiprazole (Abilify) 15 mg tablet; Take 1 tablet (15 mg) by mouth once daily.  Bipolar 2 disorder (Multi)  -     ARIPiprazole (Abilify) 15 mg tablet; Take 1 tablet (15 mg) by mouth once daily.

## 2025-06-17 ENCOUNTER — TELEMEDICINE CLINICAL SUPPORT (OUTPATIENT)
Dept: PHARMACY | Facility: HOSPITAL | Age: 55
End: 2025-06-17
Payer: COMMERCIAL

## 2025-06-17 DIAGNOSIS — L20.89 OTHER ATOPIC DERMATITIS: ICD-10-CM

## 2025-06-17 NOTE — PROGRESS NOTES
"  [facility] Clinical Note  Patient Reassessment     Introduction  Debora Latif is a 55 y.o. female who is on the specialty pharmacy service for management of: Dermatology Core.      [facility] supplied medication: Dupixent 300mg every 14 days        Duration of therapy: Maintenance    The most recent encounter visit with the referring prescriber Susan Mayne, CNP on 12/19/24 was reviewed.  Pharmacy will continue to collaborate in the care of this patient with the referring prescriber.    Discussion  Debora was contacted on 6/17/2025 at 11:57 AM for a pharmacy visit with encounter number [id number] from:   [facility]  [address]  [facility] [address]  [facility] [place] [address]  Dept: [phone]  Dept Fax: [phone]  Loc: [phone]  Debora consented to a/an Telephone visit, which was performed.    Efficacy  Patient has developed new symptoms of condition: No  Patient/caregiver feels medication is affecting the disease state: working well, 90% improved. No recent flares. Pleased with response.     Goals  Provided education on goals and possible outcomes of therapy:  Adherence with therapy  Timely completion of appropriate labs  Timely and appropriate follow up with provider  Identify and address medication interactions with presciption medications, OTC medications and supplements  Optimize or maintain quality of life  Dermatology: Prevent or reduce disease flares  Reduce use of topical agents (corticosteroids or other \"prn\" agents)  Reduce pain, itchiness, inflammation and body surface area affected by atopic dermatitis  Patient has documented target(s) for goals of therapy: Yes  Patient status for goal(s): On track    Targets       Target Due Completed Completed By Outcome Source     Goal: Prevent and reduce disease flares 10/17/2025 -- -- -- Clinical Management - 6/17/2025         Goal: Reduce use of ancillary or \"prn\" medications 10/17/2025 -- " "-- -- Clinical Management - 6/17/2025         Goal: Prevent and reduce disease flares 6/17/2025 6/17/2025 Gallito Lyn PharmD On Track Clinical Management - 6/17/2025    No recent flares       Goal: Reduce use of ancillary or \"prn\" medications 6/17/2025 6/17/2025 Gallito Lyn PharmD On Track Clinical Management - 6/17/2025    Not using any topicals other than under breast for rash               Tolerance  Patient has experienced side effects from this medication: No  Changes to current therapy regimen: No    The follow-up timeline was discussed. Every person responds to and reacts to therapy differently. Patient should be assessed for efficacy and tolerability in approximately: other - 4 months            Adherence  Patient Information  Informant: Self (Patient)  Demonstrates Understanding of Importance of Adherence: Yes  Does the patient have any barriers to self-administration (including physical and mental?): No  Adherence Tools Used: Calendar  Medication Information  Medication: dupilumab (Dupixent)  Patient Reported Missed Doses in the Last 4 Weeks: 0  Estimated Medication Adherence Level: Good  Barriers to Adherence: No Problems identified   The importance of adherence was discussed and patient/caregiver was advised to take the medication as prescribed by their provider. Encouraged patient/caregiver to call physician's office or specialty pharmacy if they have a question regarding a missed dose.    General Assessment  Changes to home medications, OTCs or supplements: Yes - increased dose of Abilify, thinks it is causing a rash, has call out to PCP. Added oxybutynin  Current Medications[1]  Reported new allergies: No  Reported new medical conditions: No  Additional monitoring reviewed: Dermatology- No lab monitoring needed- There are no routine laboratory monitoring parameters for this medication  Is laboratory follow up needed? No    Advised to contact the pharmacy if there are any changes to the patient's " medication list, including prescriptions, OTC medications, herbal products, or supplements.    Impression/Plan  This patient has not been identified as high risk due to Lack of high risk qualifiers.  The following action was taken:N/A          QOL/Patient Satisfaction  Rate your quality of life on scale of 1-10: 8  Rate your satisfaction with  Specialty Pharmacy on scale of 1-10: 10 - Completely satisfied    Provided contact information ([phone]) for [facility] and reviewed dispensing process, refill timeline and patient management follow up. Confirmed understanding of education conducted during assessment. All questions and concerns were addressed and patient/caregiver was encouraged to reach out for additional questions or concerns.    Based on the patient's diagnosis, medication list, progress towards goals, adherence, tolerance, and medication list, medication remains appropriate: Therapy remains appropriate (I attest)    Gallito Lyn, PharmD       [1]   Current Outpatient Medications   Medication Sig Dispense Refill    albuterol (ProAir HFA) 90 mcg/actuation inhaler Inhale 2 puffs every 4 hours if needed for wheezing or shortness of breath. 8.5 g 0    ARIPiprazole (Abilify) 15 mg tablet Take 1 tablet (15 mg) by mouth once daily. 30 tablet 1    dupilumab (Dupixent) 300 mg/2 mL pen injector Inject 1 pen (300 mg) under the skin every 14 (fourteen) days. 4 mL 9    escitalopram (Lexapro) 20 mg tablet Take 1 tablet (20 mg) by mouth once daily. 90 tablet 0    metoprolol succinate XL (Toprol-XL) 50 mg 24 hr tablet Take 1 tablet (50 mg) by mouth once daily. 90 tablet 0    oxybutynin XL (Ditropan XL) 5 mg 24 hr tablet Take 1 tablet (5 mg) by mouth once daily. Do not crush, chew, or split. 30 tablet 11     No current facility-administered medications for this visit.

## 2025-06-18 DIAGNOSIS — R21 RASH: Primary | ICD-10-CM

## 2025-06-18 RX ORDER — CLOTRIMAZOLE AND BETAMETHASONE DIPROPIONATE 10; .64 MG/G; MG/G
1 CREAM TOPICAL 2 TIMES DAILY
Qty: 45 G | Refills: 0 | Status: SHIPPED | OUTPATIENT
Start: 2025-06-18 | End: 2025-07-18

## 2025-06-24 ENCOUNTER — SPECIALTY PHARMACY (OUTPATIENT)
Dept: PHARMACY | Facility: CLINIC | Age: 55
End: 2025-06-24

## 2025-06-24 ENCOUNTER — PHARMACY VISIT (OUTPATIENT)
Dept: PHARMACY | Facility: CLINIC | Age: 55
End: 2025-06-24
Payer: COMMERCIAL

## 2025-06-24 PROCEDURE — RXMED WILLOW AMBULATORY MEDICATION CHARGE

## 2025-07-08 ASSESSMENT — DERMATOLOGY QUALITY OF LIFE (QOL) ASSESSMENT
RATE HOW EMOTIONALLY BOTHERED YOU ARE BY YOUR SKIN PROBLEM (FOR EXAMPLE, WORRY, EMBARRASSMENT, FRUSTRATION): 0 - NEVER BOTHERED
RATE HOW BOTHERED YOU ARE BY SYMPTOMS OF YOUR SKIN PROBLEM (EG, ITCHING, STINGING BURNING, HURTING OR SKIN IRRITATION): 2
RATE HOW BOTHERED YOU ARE BY SYMPTOMS OF YOUR SKIN PROBLEM (EG, ITCHING, STINGING BURNING, HURTING OR SKIN IRRITATION): 2
RATE HOW BOTHERED YOU ARE BY EFFECTS OF YOUR SKIN PROBLEMS ON YOUR ACTIVITIES (EG, GOING OUT, ACCOMPLISHING WHAT YOU WANT, WORK ACTIVITIES OR YOUR RELATIONSHIPS WITH OTHERS): 2
WHAT SINGLE SKIN CONDITION LISTED BELOW IS THE PATIENT ANSWERING THE QUALITY-OF-LIFE ASSESSMENT QUESTIONS ABOUT: DERMATITIS
DATE THE QUALITY-OF-LIFE ASSESSMENT WAS COMPLETED: 67394
WHAT SINGLE SKIN CONDITION LISTED BELOW IS THE PATIENT ANSWERING THE QUALITY-OF-LIFE ASSESSMENT QUESTIONS ABOUT: DERMATITIS
RATE HOW BOTHERED YOU ARE BY EFFECTS OF YOUR SKIN PROBLEMS ON YOUR ACTIVITIES (EG, GOING OUT, ACCOMPLISHING WHAT YOU WANT, WORK ACTIVITIES OR YOUR RELATIONSHIPS WITH OTHERS): 2
RATE HOW EMOTIONALLY BOTHERED YOU ARE BY YOUR SKIN PROBLEM (FOR EXAMPLE, WORRY, EMBARRASSMENT, FRUSTRATION): 0 - NEVER BOTHERED

## 2025-07-08 ASSESSMENT — PATIENT GLOBAL ASSESSMENT (PGA): WHAT IS THE PGA: PATIENT GLOBAL ASSESSMENT:  1 - CLEAR

## 2025-07-09 ENCOUNTER — APPOINTMENT (OUTPATIENT)
Dept: DERMATOLOGY | Facility: CLINIC | Age: 55
End: 2025-07-09
Payer: COMMERCIAL

## 2025-07-09 DIAGNOSIS — L81.4 LENTIGO: ICD-10-CM

## 2025-07-09 DIAGNOSIS — L82.1 SEBORRHEIC KERATOSIS: ICD-10-CM

## 2025-07-09 DIAGNOSIS — Z12.83 SCREENING EXAM FOR SKIN CANCER: Primary | ICD-10-CM

## 2025-07-09 DIAGNOSIS — D18.01 HEMANGIOMA OF SKIN: ICD-10-CM

## 2025-07-09 DIAGNOSIS — Z12.83 ENCOUNTER FOR SCREENING FOR MALIGNANT NEOPLASM OF SKIN: ICD-10-CM

## 2025-07-09 PROCEDURE — 99213 OFFICE O/P EST LOW 20 MIN: CPT | Performed by: NURSE PRACTITIONER

## 2025-07-09 PROCEDURE — 1036F TOBACCO NON-USER: CPT | Performed by: NURSE PRACTITIONER

## 2025-07-09 NOTE — PROGRESS NOTES
Subjective     Debora Latif is a 55 y.o. female who presents for the following: Skin Check (Presents for FSE: history of NMSC (last 01/2025). Has concerns about burning like rash underneath breasts - states deodorant helps. ).     Intake Questions  Do you have any new or changing Lesions?: (Patient-Rptd) (P) Yes  Where are these new or changing lesion(s) located?: (Patient-Rptd) (P) Right arm upper and forearm  For patients coming in for a Follow-up Visit:  Have there been any changes in your health since your last visit?: (Patient-Rptd) (P) No  Are you an organ transplant recipient?: (Patient-Rptd) (P) No  Have you had or do you have a Staph Infection?: (Patient-Rptd) (P) No  Have you had or do you have Vacular Disease?: (Patient-Rptd) (P) No  Do you use sunscreen?: (Patient-Rptd) (P) Occasionally  Do you use a tanning bed?: (Patient-Rptd) (P) No  Are you trying to get pregnant?: (Patient-Rptd) (P) No  Are you on birth control?: (Patient-Rptd) (P) No  Do you have irregular menstrual cycles?: (Patient-Rptd) (P) No    Review of Systems:  No other skin or systemic complaints other than what is documented elsewhere in the note.    The following portions of the chart were reviewed this encounter and updated as appropriate:  Tobacco  Allergies  Meds  Problems  Med Hx  Surg Hx  Fam Hx         Skin Cancer History  Biopsy Log Book  Biopsied Type Location Status   7/15/24 BCC, Superficial Right Shoulder - Posterior Treatment Complete  9/17/24 12/19/24 Other Skin Eruption Left Upper Back Treatment Complete  1/27/25 1/8/25 BCC Left [place] Treatment Complete  2/18/25       Additional History      Specialty Problems          Dermatology Problems    Actinic keratosis    Hemangioma of skin and subcutaneous tissue    Melanocytic nevi of trunk    Neoplasm of uncertain behavior of skin    Other seborrheic keratosis    Pigmented skin lesion suspicious for malignant neoplasm    Basal cell adenoma    Cancer of the skin,  basal cell     Past Medical History:  Debora Latif  has a past medical history of Anxiety, Arthritis, Basal cell carcinoma, Hypertension (2023), Left shoulder pain (04/29/2025), Memory loss of, Other melanin hyperpigmentation (01/18/2022), Palpitations (03/22/2023), Personal history of other mental and behavioral disorders, and Tendinitis of left rotator cuff (04/29/2025).    Past Surgical History:  Debora Latif  has a past surgical history that includes Other surgical history (09/02/2020); Other surgical history (09/02/2020); Other surgical history (09/02/2020); and Skin biopsy (2018).    Family History:  Patient family history includes COPD in her mother and mother; Depression in her father, father, father, mother, mother, and mother; Heart failure in her paternal grandmother; Migraines in her father; Peripheral vascular disease in her paternal grandmother; Stroke in her brother, brother, and brother; ihss in her father and father; psylliosis in her mother and mother.    Social History:  Debora Latif  reports that she quit smoking about 2 years ago. Her smoking use included cigarettes. She has never been exposed to tobacco smoke. She has never used smokeless tobacco. She reports current alcohol use. She reports current drug use. Frequency: 7.00 times per week. Drug: Marijuana.    Allergies:  Codeine and Wellbutrin [bupropion hcl]    Current Medications / CAM's:  Current Medications[1]     Objective   Well appearing patient in no apparent distress; mood and affect are within normal limits.    A full examination was performed including scalp, head, eyes, ears, nose, lips, neck, chest, axillae, abdomen, back, buttocks, bilateral upper extremities, bilateral lower extremities, hands, feet, fingers, toes, fingernails, and toenails. All findings within normal limits unless otherwise noted below.    Assessment/Plan   Skin Exam  1. SCREENING EXAM FOR SKIN CANCER  Generalized  Scattered benign lesions. Scar(s) clear no  sign of recurrence.   No evidence of recurrence in scar and benign ROS.   Continue with regular total body skin exams.  ABCDEs of melanoma and atypical moles were discussed with the patient.  Patient was instructed to perform monthly self skin examination.  We recommended that the patient have regular full skin exams given an increased risk of subsequent skin cancers.  The patient was instructed to use sun protective behaviors including use of broad spectrum sunscreens and sun protective clothing to reduce risk of skin cancers.  2. ENCOUNTER FOR SCREENING FOR MALIGNANT NEOPLASM OF SKIN      Related Procedures  Follow Up In Dermatology - Established Patient  3. SEBORRHEIC KERATOSIS  Generalized  Stuck on verrucous, tan-brown papules and plaques.    Although Seborrheic Keratoses can be troublesome and unsightly, they are entirely benign.  Removal of Seborrheic Keratoses is considered a cosmetic procedure. Removal is typically performed using liquid nitrogen cryotherapy.  Treatment of current lesions does not prevent the development of new Seborrheic Keratoses in the future.  4. HEMANGIOMA OF SKIN  Generalized  Violaceous/red papule with maroon lagoons   - A cherry hemangioma is a small macule (small, flat, smooth area) or papule (small, solid bump) formed from an overgrowth of tiny blood vessels in the skin. Cherry hemangiomas are characteristically red or purplish in color. They often first appear in middle adulthood and usually increase in number with age. Cherry hemangiomas are noncancerous (benign) and are common in adults.  - Lesions are benign, reassured patient.   5. LENTIGO  Generalized  Scattered tan macules in sun-exposed areas.  A solar lentigo (plural, solar lentigines), sometimes called an age spot or liver spot, is a brown macule (small, flat, smooth area of skin) caused by chronic sun or artificial ultraviolet (UV) light exposure. There may be just one lentigo or there may be multiple. This type of  lentigo is different from lentigo simplex (discussed separately) because it is caused by exposure to UV light. Solar lentigines are benign, but they do indicate excessive sun exposure, a risk factor for the development of skin cancer.  Lesions are benign, no treatment needed.      Follow up in 6 months. Please call me if there are any changes or development of concerning symptoms (lesion/skin condition is changing, bleeding, enlarging, or worsening).           [1]   Current Outpatient Medications:     albuterol (ProAir HFA) 90 mcg/actuation inhaler, Inhale 2 puffs every 4 hours if needed for wheezing or shortness of breath., Disp: 8.5 g, Rfl: 0    ARIPiprazole (Abilify) 15 mg tablet, Take 1 tablet (15 mg) by mouth once daily., Disp: 30 tablet, Rfl: 1    clotrimazole-betamethasone (Lotrisone) cream, Apply 1 Application topically 2 times a day., Disp: 45 g, Rfl: 0    dupilumab (Dupixent) 300 mg/2 mL pen injector, Inject 1 pen (300 mg) under the skin every 14 (fourteen) days., Disp: 4 mL, Rfl: 4    escitalopram (Lexapro) 20 mg tablet, Take 1 tablet (20 mg) by mouth once daily., Disp: 90 tablet, Rfl: 0    metoprolol succinate XL (Toprol-XL) 50 mg 24 hr tablet, Take 1 tablet (50 mg) by mouth once daily., Disp: 90 tablet, Rfl: 0    oxybutynin XL (Ditropan XL) 5 mg 24 hr tablet, Take 1 tablet (5 mg) by mouth once daily. Do not crush, chew, or split., Disp: 30 tablet, Rfl: 11

## 2025-07-09 NOTE — Clinical Note
A solar lentigo (plural, solar lentigines), sometimes called an age spot or liver spot, is a brown macule (small, flat, smooth area of skin) caused by chronic sun or artificial ultraviolet (UV) light exposure. There may be just one lentigo or there may be multiple. This type of lentigo is different from lentigo simplex (discussed separately) because it is caused by exposure to UV light. Solar lentigines are benign, but they do indicate excessive sun exposure, a risk factor for the development of skin cancer.  Lesions are benign, no treatment needed.

## 2025-07-17 ENCOUNTER — PHARMACY VISIT (OUTPATIENT)
Dept: PHARMACY | Facility: CLINIC | Age: 55
End: 2025-07-17
Payer: COMMERCIAL

## 2025-07-22 ENCOUNTER — SPECIALTY PHARMACY (OUTPATIENT)
Dept: PHARMACY | Facility: CLINIC | Age: 55
End: 2025-07-22

## 2025-07-22 PROCEDURE — RXMED WILLOW AMBULATORY MEDICATION CHARGE

## 2025-07-30 ENCOUNTER — APPOINTMENT (OUTPATIENT)
Dept: PRIMARY CARE | Facility: CLINIC | Age: 55
End: 2025-07-30
Payer: COMMERCIAL

## 2025-07-30 ENCOUNTER — SPECIALTY PHARMACY (OUTPATIENT)
Dept: PHARMACY | Facility: CLINIC | Age: 55
End: 2025-07-30

## 2025-07-30 VITALS
HEART RATE: 71 BPM | TEMPERATURE: 97.1 F | RESPIRATION RATE: 18 BRPM | SYSTOLIC BLOOD PRESSURE: 126 MMHG | HEIGHT: 66 IN | OXYGEN SATURATION: 95 % | DIASTOLIC BLOOD PRESSURE: 80 MMHG | BODY MASS INDEX: 30.86 KG/M2 | WEIGHT: 192 LBS

## 2025-07-30 DIAGNOSIS — F31.81 BIPOLAR 2 DISORDER (MULTI): ICD-10-CM

## 2025-07-30 DIAGNOSIS — R13.19 ESOPHAGEAL DYSPHAGIA: ICD-10-CM

## 2025-07-30 DIAGNOSIS — R32 URINARY INCONTINENCE, UNSPECIFIED TYPE: Primary | ICD-10-CM

## 2025-07-30 DIAGNOSIS — R41.3 MEMORY DEFICIT: ICD-10-CM

## 2025-07-30 DIAGNOSIS — I10 HYPERTENSION, UNSPECIFIED TYPE: ICD-10-CM

## 2025-07-30 PROCEDURE — 3008F BODY MASS INDEX DOCD: CPT | Performed by: FAMILY MEDICINE

## 2025-07-30 PROCEDURE — 3074F SYST BP LT 130 MM HG: CPT | Performed by: FAMILY MEDICINE

## 2025-07-30 PROCEDURE — 99213 OFFICE O/P EST LOW 20 MIN: CPT | Performed by: FAMILY MEDICINE

## 2025-07-30 PROCEDURE — 3079F DIAST BP 80-89 MM HG: CPT | Performed by: FAMILY MEDICINE

## 2025-07-30 RX ORDER — ARIPIPRAZOLE 20 MG/1
20 TABLET ORAL DAILY
COMMUNITY

## 2025-07-30 RX ORDER — MIRABEGRON 25 MG/1
25 TABLET, FILM COATED, EXTENDED RELEASE ORAL DAILY
Qty: 90 TABLET | Refills: 0 | Status: SHIPPED | OUTPATIENT
Start: 2025-07-30 | End: 2025-10-28

## 2025-07-30 RX ORDER — BUSPIRONE HYDROCHLORIDE 5 MG/1
5 TABLET ORAL 2 TIMES DAILY
COMMUNITY

## 2025-07-30 RX ORDER — METOPROLOL SUCCINATE 50 MG/1
50 TABLET, EXTENDED RELEASE ORAL DAILY
Qty: 90 TABLET | Refills: 0 | Status: SHIPPED | OUTPATIENT
Start: 2025-07-30

## 2025-07-30 RX ORDER — RISPERIDONE 0.5 MG/1
0.25 TABLET, ORALLY DISINTEGRATING ORAL DAILY
COMMUNITY

## 2025-07-30 ASSESSMENT — PATIENT HEALTH QUESTIONNAIRE - PHQ9
1. LITTLE INTEREST OR PLEASURE IN DOING THINGS: NOT AT ALL
2. FEELING DOWN, DEPRESSED OR HOPELESS: NOT AT ALL
SUM OF ALL RESPONSES TO PHQ9 QUESTIONS 1 AND 2: 0

## 2025-07-30 NOTE — PROGRESS NOTES
"Subjective   Patient ID: Debora Latif is a 55 y.o. female who presents for Follow-up (3 months).    HPI   Her daughter is very sick and may not make it. She is seeing a psychiatrist who adjusted her meds.  She is also in counselling and doing some EMDR. Her anxiety was so bad she was pulling off the side of the road yelling. Her daughter has stage 4 endo and gastroparesis.  Feels like food is getting stuck and she has to drink with each bite. Has been worse over the months.  Review of Systems   All other systems reviewed and are negative.      Objective   /80 (BP Location: Left arm, Patient Position: Sitting, BP Cuff Size: Adult)   Pulse 71   Temp 36.2 °C (97.1 °F) (Temporal)   Resp 18   Ht 1.676 m (5' 6\")   Wt 87.1 kg (192 lb)   SpO2 95%   BMI 30.99 kg/m²     Physical Exam  Constitutional:       Appearance: Normal appearance.   HENT:      Head: Normocephalic.      Right Ear: Tympanic membrane normal.      Nose: Nose normal.      Mouth/Throat:      Mouth: Mucous membranes are moist.     Eyes:      Pupils: Pupils are equal, round, and reactive to light.       Cardiovascular:      Rate and Rhythm: Normal rate and regular rhythm.      Pulses: Normal pulses.   Pulmonary:      Effort: Pulmonary effort is normal.   Abdominal:      General: Abdomen is flat.     Musculoskeletal:         General: Normal range of motion.      Cervical back: Normal range of motion.     Skin:     General: Skin is warm.     Neurological:      Mental Status: She is alert.     Psychiatric:         Mood and Affect: Mood normal.         Assessment/Plan   Diagnoses and all orders for this visit:  Urinary incontinence, unspecified type  -     mirabegron (Myrbetriq) 25 mg tablet extended release 24 hr; Take 1 tablet (25 mg) by mouth once daily. Take with water. Do not chew, crush, or divide.  Memory deficit  -     metoprolol succinate XL (Toprol-XL) 50 mg 24 hr tablet; Take 1 tablet (50 mg) by mouth once daily.  Bipolar 2 disorder " (Multi)  Hypertension, unspecified type  -     metoprolol succinate XL (Toprol-XL) 50 mg 24 hr tablet; Take 1 tablet (50 mg) by mouth once daily.  Esophageal dysphagia  -     Referral to Gastroenterology; Future

## 2025-08-06 DIAGNOSIS — I10 HYPERTENSION, UNSPECIFIED TYPE: ICD-10-CM

## 2025-08-06 DIAGNOSIS — Z00.00 WELL ADULT EXAM: ICD-10-CM

## 2025-08-06 DIAGNOSIS — R06.00 DYSPNEA, UNSPECIFIED TYPE: ICD-10-CM

## 2025-08-07 RX ORDER — ALBUTEROL SULFATE 90 UG/1
INHALANT RESPIRATORY (INHALATION)
Qty: 8.5 G | Refills: 0 | Status: SHIPPED | OUTPATIENT
Start: 2025-08-07

## 2025-08-22 ENCOUNTER — PHARMACY VISIT (OUTPATIENT)
Dept: PHARMACY | Facility: CLINIC | Age: 55
End: 2025-08-22
Payer: COMMERCIAL

## 2025-08-22 ENCOUNTER — SPECIALTY PHARMACY (OUTPATIENT)
Dept: PHARMACY | Facility: CLINIC | Age: 55
End: 2025-08-22

## 2025-08-27 ENCOUNTER — SPECIALTY PHARMACY (OUTPATIENT)
Dept: PHARMACY | Facility: CLINIC | Age: 55
End: 2025-08-27

## 2025-10-30 ENCOUNTER — APPOINTMENT (OUTPATIENT)
Dept: PRIMARY CARE | Facility: CLINIC | Age: 55
End: 2025-10-30
Payer: COMMERCIAL

## 2026-01-08 ENCOUNTER — APPOINTMENT (OUTPATIENT)
Dept: DERMATOLOGY | Facility: CLINIC | Age: 56
End: 2026-01-08
Payer: COMMERCIAL